# Patient Record
Sex: FEMALE | Race: WHITE | NOT HISPANIC OR LATINO | Employment: FULL TIME
[De-identification: names, ages, dates, MRNs, and addresses within clinical notes are randomized per-mention and may not be internally consistent; named-entity substitution may affect disease eponyms.]

---

## 2017-11-12 ENCOUNTER — HEALTH MAINTENANCE LETTER (OUTPATIENT)
Age: 57
End: 2017-11-12

## 2019-06-11 ENCOUNTER — RECORDS - HEALTHEAST (OUTPATIENT)
Dept: LAB | Facility: CLINIC | Age: 59
End: 2019-06-11

## 2019-06-11 LAB
CHOLEST SERPL-MCNC: 222 MG/DL
FASTING STATUS PATIENT QL REPORTED: YES
FASTING STATUS PATIENT QL REPORTED: YES
GLUCOSE BLD-MCNC: 94 MG/DL (ref 70–125)
HDLC SERPL-MCNC: 83 MG/DL
LDLC SERPL CALC-MCNC: 127 MG/DL
TRIGL SERPL-MCNC: 59 MG/DL
TSH SERPL DL<=0.005 MIU/L-ACNC: 0.96 UIU/ML (ref 0.3–5)

## 2020-03-02 ENCOUNTER — HEALTH MAINTENANCE LETTER (OUTPATIENT)
Age: 60
End: 2020-03-02

## 2020-12-14 ENCOUNTER — HEALTH MAINTENANCE LETTER (OUTPATIENT)
Age: 60
End: 2020-12-14

## 2020-12-17 ENCOUNTER — RECORDS - HEALTHEAST (OUTPATIENT)
Dept: LAB | Facility: CLINIC | Age: 60
End: 2020-12-17

## 2020-12-17 LAB
CHOLEST SERPL-MCNC: 183 MG/DL
FASTING STATUS PATIENT QL REPORTED: NORMAL
HDLC SERPL-MCNC: 66 MG/DL
LDLC SERPL CALC-MCNC: 102 MG/DL
TRIGL SERPL-MCNC: 77 MG/DL
TSH SERPL DL<=0.005 MIU/L-ACNC: 1.37 UIU/ML (ref 0.3–5)

## 2021-02-16 LAB — PAP SMEAR - HIM PATIENT REPORTED: NEGATIVE

## 2021-03-23 ENCOUNTER — IMMUNIZATION (OUTPATIENT)
Dept: NURSING | Facility: CLINIC | Age: 61
End: 2021-03-23
Payer: COMMERCIAL

## 2021-03-23 PROCEDURE — 91300 PR COVID VAC PFIZER DIL RECON 30 MCG/0.3 ML IM: CPT

## 2021-03-23 PROCEDURE — 0001A PR COVID VAC PFIZER DIL RECON 30 MCG/0.3 ML IM: CPT

## 2021-04-13 ENCOUNTER — IMMUNIZATION (OUTPATIENT)
Dept: NURSING | Facility: CLINIC | Age: 61
End: 2021-04-13
Attending: FAMILY MEDICINE
Payer: COMMERCIAL

## 2021-04-13 PROCEDURE — 91300 PR COVID VAC PFIZER DIL RECON 30 MCG/0.3 ML IM: CPT

## 2021-04-13 PROCEDURE — 0002A PR COVID VAC PFIZER DIL RECON 30 MCG/0.3 ML IM: CPT

## 2021-04-18 ENCOUNTER — HEALTH MAINTENANCE LETTER (OUTPATIENT)
Age: 61
End: 2021-04-18

## 2021-05-25 ENCOUNTER — RECORDS - HEALTHEAST (OUTPATIENT)
Dept: ADMINISTRATIVE | Facility: CLINIC | Age: 61
End: 2021-05-25

## 2021-05-26 ENCOUNTER — RECORDS - HEALTHEAST (OUTPATIENT)
Dept: ADMINISTRATIVE | Facility: CLINIC | Age: 61
End: 2021-05-26

## 2021-06-01 ENCOUNTER — RECORDS - HEALTHEAST (OUTPATIENT)
Dept: ADMINISTRATIVE | Facility: CLINIC | Age: 61
End: 2021-06-01

## 2021-10-02 ENCOUNTER — HEALTH MAINTENANCE LETTER (OUTPATIENT)
Age: 61
End: 2021-10-02

## 2021-12-02 ENCOUNTER — LAB REQUISITION (OUTPATIENT)
Dept: LAB | Facility: CLINIC | Age: 61
End: 2021-12-02

## 2021-12-02 DIAGNOSIS — E03.9 HYPOTHYROIDISM, UNSPECIFIED: ICD-10-CM

## 2021-12-02 PROCEDURE — 84443 ASSAY THYROID STIM HORMONE: CPT | Performed by: FAMILY MEDICINE

## 2021-12-03 LAB — TSH SERPL DL<=0.005 MIU/L-ACNC: 0.39 UIU/ML (ref 0.3–5)

## 2022-03-02 ENCOUNTER — MEDICAL CORRESPONDENCE (OUTPATIENT)
Dept: HEALTH INFORMATION MANAGEMENT | Facility: CLINIC | Age: 62
End: 2022-03-02
Payer: COMMERCIAL

## 2022-03-02 ENCOUNTER — TRANSFERRED RECORDS (OUTPATIENT)
Dept: HEALTH INFORMATION MANAGEMENT | Facility: CLINIC | Age: 62
End: 2022-03-02
Payer: COMMERCIAL

## 2022-03-16 ENCOUNTER — TRANSFERRED RECORDS (OUTPATIENT)
Dept: HEALTH INFORMATION MANAGEMENT | Facility: CLINIC | Age: 62
End: 2022-03-16

## 2022-03-19 ENCOUNTER — HEALTH MAINTENANCE LETTER (OUTPATIENT)
Age: 62
End: 2022-03-19

## 2022-05-09 ENCOUNTER — LAB REQUISITION (OUTPATIENT)
Dept: LAB | Facility: CLINIC | Age: 62
End: 2022-05-09

## 2022-05-09 DIAGNOSIS — M81.0 AGE-RELATED OSTEOPOROSIS WITHOUT CURRENT PATHOLOGICAL FRACTURE: ICD-10-CM

## 2022-05-09 LAB
ANION GAP SERPL CALCULATED.3IONS-SCNC: 13 MMOL/L (ref 5–18)
BUN SERPL-MCNC: 16 MG/DL (ref 8–22)
CALCIUM SERPL-MCNC: 9.4 MG/DL (ref 8.5–10.5)
CHLORIDE BLD-SCNC: 108 MMOL/L (ref 98–107)
CO2 SERPL-SCNC: 20 MMOL/L (ref 22–31)
CREAT SERPL-MCNC: 0.97 MG/DL (ref 0.6–1.1)
GFR SERPL CREATININE-BSD FRML MDRD: 65 ML/MIN/1.73M2
GLUCOSE BLD-MCNC: 98 MG/DL (ref 70–125)
POTASSIUM BLD-SCNC: 4 MMOL/L (ref 3.5–5)
PTH-INTACT SERPL-MCNC: 32 PG/ML (ref 10–86)
SODIUM SERPL-SCNC: 141 MMOL/L (ref 136–145)

## 2022-05-09 PROCEDURE — 82310 ASSAY OF CALCIUM: CPT | Performed by: FAMILY MEDICINE

## 2022-05-09 PROCEDURE — 83970 ASSAY OF PARATHORMONE: CPT | Performed by: FAMILY MEDICINE

## 2022-05-09 PROCEDURE — 82306 VITAMIN D 25 HYDROXY: CPT | Performed by: FAMILY MEDICINE

## 2022-05-10 LAB — DEPRECATED CALCIDIOL+CALCIFEROL SERPL-MC: 47 UG/L (ref 20–75)

## 2022-05-11 ENCOUNTER — TELEPHONE (OUTPATIENT)
Dept: ENDOCRINOLOGY | Facility: CLINIC | Age: 62
End: 2022-05-11
Payer: COMMERCIAL

## 2022-05-11 NOTE — TELEPHONE ENCOUNTER
----- Message from Abigail Young CMA sent at 5/11/2022  9:21 AM CDT -----  Regarding: Referral from March  Received fax from Lelo Maldonado on osteo referral that was sent to us March 2022. Do not see any encounters regarding scheduling, was not seen by Ellen Lindsay.

## 2022-05-13 ENCOUNTER — TRANSCRIBE ORDERS (OUTPATIENT)
Dept: ENDOCRINOLOGY | Facility: CLINIC | Age: 62
End: 2022-05-13

## 2022-05-13 DIAGNOSIS — M81.0 OSTEOPOROSIS WITHOUT CURRENT PATHOLOGICAL FRACTURE, UNSPECIFIED OSTEOPOROSIS TYPE: Primary | ICD-10-CM

## 2022-05-14 ENCOUNTER — HEALTH MAINTENANCE LETTER (OUTPATIENT)
Age: 62
End: 2022-05-14

## 2022-08-04 NOTE — TELEPHONE ENCOUNTER
RECORDS RECEIVED FROM: Osteoporosis without current pathological fracture; referred by Dr. Colon   DATE RECEIVED: 10/5/2022   NOTES (FOR ALL VISITS) STATUS DETAILS   OFFICE NOTES from referring provider Received [facility]:  3/2/22 - OBGYN OV with Dr. Colon   OFFICE NOTES from other specialist Care Everywhere [facility]:  9/20/21 - TMD OV with Dr. Mulet Pradera   ED NOTES N/A    OPERATIVE REPORT  (thyroid, pituitary, adrenal, parathyroid) N/A    MEDICATION LIST Received    IMAGING      XR (Chest) ce  [facility]:  3/18/20 - XR Esophagus  3/18/20 - XR Video Swallow   LABS     DIABETES: HBGA1C, CREATININE, FASTING LIPIDS, MICROALBUMIN URINE, POTASSIUM, TSH, T4    THYROID: TSH, T4, CBC, THYRODLONULIN, TOTAL T3, FREE T4, CALCITONIN, CEA Internal   [facility]:  5/9/22 - BMP  5/9/22 - Parathyroid Hormone  5/9/22 - Vitamin D  12/2/21 - TSH, T4  12/17/20 - Lipid

## 2022-09-03 ENCOUNTER — HEALTH MAINTENANCE LETTER (OUTPATIENT)
Age: 62
End: 2022-09-03

## 2022-10-04 NOTE — PROGRESS NOTES
Autumn Mason is a 61 year old female who is being evaluated via a billable video visit.        Endocrinology and Diabetes Clinic    Consulting provider: Ursula Colon MD  [address]  [place] [address]    Reason for consultation: Osteoporosis    HPI:   Autumn Mason is a 61 year old woman coming in regards to osteoporosis., FH of Cowden's disease, jaw pain, GERD, hepatitis, DONALD, hypothyrodisim, ulcerative colitis, fibromyalgia,   Patient was diagnosed via bone density report several years ago.  Patient had been on Boniva for about 5 years, which was stopped in fall 2021 about 1 year ago. The effect of Boniva treatment on bone density was felt to be insuffient.  Patient briefly was treated with calcitonin nasal spray.  Bone density has not improved on most recent DEXA scan.  Patient was switched to Prolia.  Received first injection this May or June 2022.  This was given in her primary care's office..  Patient tolerated the injection well.  Patient is concerned about high copayment for the Prolia injection.  Patient does not think she paid full sprague. Se notes that she had met all her deductibles otherwise when receiving the Prolia.    Today there is no bone density report available to us for review.Most recent DXA scan 1/1/2019    Had imaging thyroid, transvaginal pelvic ultrasound on thyroid our system in 3/2022      Prior fractures in 1/2019 fractures collar bone fall on ice  Height loss about 1 inch    Prior osteoporosis medications Calcitonin injections, Denosumab, Ibandronate    Estrogen treatment no    Falls in the last year no    Dietary Calcium: avoids dairy, consumes almond milk  Dietary Protein intake sufficient  Calcium supplements twice day  Vitamin D supplements 1000 units daily  Alcohol consumption no  Smoking no  Caffein consumption 2-4 cups a day    Exercise less since  is sick, 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  Will review DEXA report once available.  After 5 years of Boniva treatment, treatment was intensified to Denosumab. She received Prolia earlier this spring which she tolerated well.  However concern of high copay at the time., unclear what that payment actually reflected.  Risk factor for osteoporosis include postmenopausal state, otherwise family history of osteoporosis in her maternal grandmother.  Lab evaluation has been unrevealing, calcium intake appears sufficient, vitamin D levels have been in excellent range.    Plan:   Obtain DXA report  If diagnosis is confirmed, will order Prolia treatment to be done in our infusion clinic if possible.  Continue calcium and vitamin D  Follow-up with me in 6 months      Kymberly Francisco MD  Endocrinology and Diabetes  Telephone contact:  [facility] [place] [phone]  [facility] [phone]        Past Medical and Past Surgical History:  Past Medical History:   Diagnosis Date     GERD (gastroesophageal reflux disease)      Hepatitis C, chronic (H)      Nosebleeds      Obstructive sleep apnea      Thyroid disease     hypothyroidism     Ulcerative colitis        Past Surgical History:   Procedure Laterality Date     APPENDECTOMY       ENT SURGERY      sinus surgery     GYN SURGERY      Bilateral Salpingectomy and Oopherectomy       Medications:   Current Outpatient Medications   Medication Sig Dispense Refill     Ascorbic Acid (VITAMIN C CR) 1000 MG TBCR Take  by mouth.       Calcium Carbonate-Vitamin D (CALCIUM 600  + D OR) Take 2 tablets by mouth daily.       celecoxib (CELEBREX) 200 MG capsule Take 200 mg by mouth daily.       cetirizine (ZYRTEC ALLERGY) 10 MG tablet Take 10 mg by mouth daily       Cholecalciferol (VITAMIN D) 1000 UNITS capsule Take 1 capsule by mouth daily.       Ferrous Sulfate (IRON) 325 (65 FE) MG tablet Take 1 tablet by mouth daily.       levothyroxine (SYNTHROID, LEVOTHROID) 137 MCG tablet Take 125 mcg by mouth daily        mesalamine (DELZICOL) 400 MG delayed release capsule Take 400 mg by mouth 3 times daily       milnacipran (SAVELLA) 100 MG TABS Take 100 mg by mouth. 1-2 times per day       NORETHINDRONE PO Take 7.5 mg by mouth. daily       omeprazole (PRILOSEC) 40 MG capsule Take 40 mg by mouth. 1-2 times per day       Probiotic Product (ALIGN) 4 MG CAPS Take 1 tablet by mouth daily.       RISEdronate (ACTONEL) 35 MG tablet Take 35 mg by mouth every 7 days.       sucralfate (CARAFATE) 1 GM tablet Take 1 g by mouth. 1-4 times per day       topiramate (TOPAMAX) 200 MG tablet Take 200 mg by mouth. 1 x daily       venlafaxine (EFFEXOR) 75 MG tablet Take  by mouth. 1 x a day         Allergies:   Allergies   Allergen Reactions     Fosamax Other (See Comments)     Body aches       Zonisamide Other (See Comments)     depression     Bacitracin Rash     Erythromycin Rash     Neosporin Rash       Social History     Tobacco Use     Smoking status: Never Smoker     Smokeless tobacco: Not on file   Substance Use Topics     Alcohol use: No       Physical Examination:  BP (!) 149/84 (BP Location: Left arm, Patient Position: Sitting, Cuff Size: Adult Regular)   Pulse 88   Wt 75.3 kg (166 lb 1.6 oz)   BMI 27.64 kg/m      There were no vitals taken for this visit.  There is no height or weight on file to calculate BMI.    Wt Readings from Last 4 Encounters:  09/05/13 : 79.8 kg (176 lb)  08/22/13 : 72.6 kg (160 lb)  03/14/12 : 62.1 kg (137 lb)    General: Well appearing female in no distress, up and go quick  Eyes:  EOMI. Sclerae and conjunctivae are clear.   HENT: No thyromegaly or mass.    Lymphatic: No cervical or supraclavicular lymphadenopathy.  Cardiovascular: RRR, with normal S1+S2 and no murmurs.   Skin: No rash or lesions. No abdominal striae.    Extremities: No peripheral edema.   Neurologic: No tremor with hands outstretched. 2+ patellar reflexes.  Muskuloskeletal: rib-pelvis distance 2 finger breath,     Labs and Studies:   Lab Results   Component Value Date    DAYNE 9.4 05/09/2022    DAYNE 8.7 03/14/2012    PTHI 32 05/09/2022    CR 0.97 05/09/2022    CR 0.77 03/14/2012     05/09/2022    TSH 0.39 12/02/2021    HGB 10.1 (L) 03/14/2012 5/9/22 25OHVitD 47    Answers for HPI/ROS submitted by the patient on 9/29/2022  General Symptoms: No  Skin Symptoms: No  HENT Symptoms: No  EYE SYMPTOMS: No  HEART SYMPTOMS: No  LUNG SYMPTOMS: No  INTESTINAL SYMPTOMS: No  URINARY SYMPTOMS: No  GYNECOLOGIC SYMPTOMS: No  BREAST SYMPTOMS: No  SKELETAL SYMPTOMS: No  BLOOD SYMPTOMS: No  NERVOUS SYSTEM SYMPTOMS: No  MENTAL HEALTH SYMPTOMS: No

## 2022-10-05 ENCOUNTER — PRE VISIT (OUTPATIENT)
Dept: ENDOCRINOLOGY | Facility: CLINIC | Age: 62
End: 2022-10-05

## 2022-10-05 ENCOUNTER — OFFICE VISIT (OUTPATIENT)
Dept: ENDOCRINOLOGY | Facility: CLINIC | Age: 62
End: 2022-10-05
Attending: OBSTETRICS & GYNECOLOGY
Payer: COMMERCIAL

## 2022-10-05 VITALS
HEART RATE: 88 BPM | SYSTOLIC BLOOD PRESSURE: 149 MMHG | BODY MASS INDEX: 27.64 KG/M2 | DIASTOLIC BLOOD PRESSURE: 84 MMHG | WEIGHT: 166.1 LBS

## 2022-10-05 DIAGNOSIS — M81.0 OSTEOPOROSIS WITHOUT CURRENT PATHOLOGICAL FRACTURE, UNSPECIFIED OSTEOPOROSIS TYPE: ICD-10-CM

## 2022-10-05 PROCEDURE — 99204 OFFICE O/P NEW MOD 45 MIN: CPT | Performed by: INTERNAL MEDICINE

## 2022-10-05 ASSESSMENT — PAIN SCALES - GENERAL: PAINLEVEL: NO PAIN (0)

## 2022-10-05 NOTE — LETTER
10/5/2022       RE: Autumn Mason  [address] [address]     Dear Colleague,    Thank you for referring your patient, Autumn Mason, to the [facility] [place] at [facility]. Please see a copy of my visit note below.      Autumn Mason is a 61 year old female who is being evaluated via a billable video visit.        Endocrinology and Diabetes Clinic    Consulting provider: Ursula Colon MD  [address]  [place] [address]    Reason for consultation: Osteoporosis    HPI:   Autumn Mason is a 61 year old woman coming in regards to osteoporosis., FH of Cowden's disease, jaw pain, GERD, hepatitis, DONALD, hypothyrodisim, ulcerative colitis, fibromyalgia,   Patient was diagnosed via bone density report several years ago.  Patient had been on Boniva for about 5 years, which was stopped in fall 2021 about 1 year ago. The effect of Boniva treatment on bone density was felt to be insuffient.  Patient briefly was treated with calcitonin nasal spray.  Bone density has not improved on most recent DEXA scan.  Patient was switched to Prolia.  Received first injection this May or June 2022.  This was given in her primary care's office..  Patient tolerated the injection well.  Patient is concerned about high copayment for the Prolia injection.  Patient does not think she paid full sprague. Se notes that she had met all her deductibles otherwise when receiving the Prolia.    Today there is no bone density report available to us for review.Most recent DXA scan 1/1/2019    Had imaging thyroid, transvaginal pelvic ultrasound on thyroid our system in 3/2022      Prior fractures in 1/2019 fractures collar bone fall on ice  Height loss about 1 inch    Prior osteoporosis medications Calcitonin injections, Denosumab, Ibandronate    Estrogen treatment no    Falls in the last year no    Dietary Calcium: avoids dairy, consumes  almond milk  Dietary Protein intake sufficient  Calcium supplements twice day  Vitamin D supplements 1000 units daily  Alcohol consumption no  Smoking no  Caffein consumption 2-4 cups a day    Exercise less since  is sick, 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  Will review DEXA report once available.  After 5 years of Boniva treatment, treatment was intensified to Denosumab. She received Prolia earlier this spring which she tolerated well.  However concern of high copay at the time., unclear what that payment actually reflected.  Risk factor for osteoporosis include postmenopausal state, otherwise family history of osteoporosis in her maternal grandmother.  Lab evaluation has been unrevealing, calcium intake appears sufficient, vitamin D levels have been in excellent range.    Plan:   Obtain DXA report  If diagnosis is confirmed, will order Prolia treatment to be done in our infusion clinic if possible.  Continue calcium and vitamin D  Follow-up with me in 6 months      Kymberly Francisco MD  Endocrinology and Diabetes  Telephone contact:  [facility] [place] [phone]  [facility] [phone]        Past Medical and Past Surgical History:  Past Medical History:   Diagnosis Date     GERD (gastroesophageal reflux disease)      Hepatitis C, chronic (H)      Nosebleeds      Obstructive sleep apnea      Thyroid disease     hypothyroidism     Ulcerative colitis        Past Surgical History:   Procedure  Laterality Date     APPENDECTOMY       ENT SURGERY      sinus surgery     GYN SURGERY      Bilateral Salpingectomy and Oopherectomy       Medications:   Current Outpatient Medications   Medication Sig Dispense Refill     Ascorbic Acid (VITAMIN C CR) 1000 MG TBCR Take  by mouth.       Calcium Carbonate-Vitamin D (CALCIUM 600 + D OR) Take 2 tablets by mouth daily.       celecoxib (CELEBREX) 200 MG capsule Take 200 mg by mouth daily.       cetirizine (ZYRTEC ALLERGY) 10 MG tablet Take 10 mg by mouth daily       Cholecalciferol (VITAMIN D) 1000 UNITS capsule Take 1 capsule by mouth daily.       Ferrous Sulfate (IRON) 325 (65 FE) MG tablet Take 1 tablet by mouth daily.       levothyroxine (SYNTHROID, LEVOTHROID) 137 MCG tablet Take 125 mcg by mouth daily        mesalamine (DELZICOL) 400 MG delayed release capsule Take 400 mg by mouth 3 times daily       milnacipran (SAVELLA) 100 MG TABS Take 100 mg by mouth. 1-2 times per day       NORETHINDRONE PO Take 7.5 mg by mouth. daily       omeprazole (PRILOSEC) 40 MG capsule Take 40 mg by mouth. 1-2 times per day       Probiotic Product (ALIGN) 4 MG CAPS Take 1 tablet by mouth daily.       RISEdronate (ACTONEL) 35 MG tablet Take 35 mg by mouth every 7 days.       sucralfate (CARAFATE) 1 GM tablet Take 1 g by mouth. 1-4 times per day       topiramate (TOPAMAX) 200 MG tablet Take 200 mg by mouth. 1 x daily       venlafaxine (EFFEXOR) 75 MG tablet Take  by mouth. 1 x a day         Allergies:   Allergies   Allergen Reactions     Fosamax Other (See Comments)     Body aches       Zonisamide Other (See Comments)     depression     Bacitracin Rash     Erythromycin Rash     Neosporin Rash       Social History     Tobacco Use     Smoking status: Never Smoker     Smokeless tobacco: Not on file   Substance Use Topics     Alcohol use: No       Physical Examination:  BP (!) 149/84 (BP Location: Left arm, Patient Position: Sitting, Cuff Size: Adult Regular)   Pulse 88   Wt 75.3 kg (166 lb  1.6 oz)   BMI 27.64 kg/m      There were no vitals taken for this visit.  There is no height or weight on file to calculate BMI.    Wt Readings from Last 4 Encounters:  09/05/13 : 79.8 kg (176 lb)  08/22/13 : 72.6 kg (160 lb)  03/14/12 : 62.1 kg (137 lb)    General: Well appearing female in no distress, up and go quick  Eyes: EOMI. Sclerae and conjunctivae are clear.   HENT: No thyromegaly or mass.    Lymphatic: No cervical or supraclavicular lymphadenopathy.  Cardiovascular: RRR, with normal S1+S2 and no murmurs.   Skin: No rash or lesions. No abdominal striae.    Extremities: No peripheral edema.   Neurologic: No tremor with hands outstretched. 2+ patellar reflexes.  Muskuloskeletal: rib-pelvis distance 2 finger breath,     Labs and Studies:   Lab Results   Component Value Date    DAYNE 9.4 05/09/2022    DAYNE 8.7 03/14/2012    PTHI 32 05/09/2022    CR 0.97 05/09/2022    CR 0.77 03/14/2012     05/09/2022    TSH 0.39 12/02/2021    HGB 10.1 (L) 03/14/2012 5/9/22 25OHVitD 47    Answers for HPI/ROS submitted by the patient on 9/29/2022  General Symptoms: No  Skin Symptoms: No  HENT Symptoms: No  EYE SYMPTOMS: No  HEART SYMPTOMS: No  LUNG SYMPTOMS: No  INTESTINAL SYMPTOMS: No  URINARY SYMPTOMS: No  GYNECOLOGIC SYMPTOMS: No  BREAST SYMPTOMS: No  SKELETAL SYMPTOMS: No  BLOOD SYMPTOMS: No  NERVOUS SYSTEM SYMPTOMS: No  MENTAL HEALTH SYMPTOMS: No

## 2022-10-05 NOTE — PATIENT INSTRUCTIONS
We are gong to try to order the Prolia injections    Please find out injkection date of prior [id number] injection    Please find prior DXA and have facility send the report, and image

## 2022-10-06 RX ORDER — CHOLECALCIFEROL (VITAMIN D3) 10(400)/ML
DROPS ORAL
COMMUNITY
End: 2023-04-05

## 2022-11-07 ENCOUNTER — HOSPITAL ENCOUNTER (OUTPATIENT)
Dept: MRI IMAGING | Facility: HOSPITAL | Age: 62
Discharge: HOME | End: 2022-11-07
Attending: OBSTETRICS & GYNECOLOGY | Admitting: OBSTETRICS & GYNECOLOGY
Payer: COMMERCIAL

## 2022-11-07 DIAGNOSIS — Z82.79 FAMILY HISTORY OF OTHER CONGENITAL MALFORMATIONS, DEFORMATIONS AND CHROMOSOMAL ABNORMALITIES: ICD-10-CM

## 2022-11-07 PROCEDURE — 255N000002 HC RX 255 OP 636: Performed by: OBSTETRICS & GYNECOLOGY

## 2022-11-07 PROCEDURE — A9585 GADOBUTROL INJECTION: HCPCS | Performed by: OBSTETRICS & GYNECOLOGY

## 2022-11-07 PROCEDURE — 77049 MRI BREAST C-+ W/CAD BI: CPT

## 2022-11-07 RX ORDER — GADOBUTROL 604.72 MG/ML
0.1 INJECTION INTRAVENOUS ONCE
Status: COMPLETED | OUTPATIENT
Start: 2022-11-07 | End: 2022-11-07

## 2022-11-07 RX ADMIN — GADOBUTROL 7.5 ML: 604.72 INJECTION INTRAVENOUS at 08:30

## 2023-02-09 ENCOUNTER — ANCILLARY PROCEDURE (OUTPATIENT)
Dept: MAMMOGRAPHY | Facility: CLINIC | Age: 63
End: 2023-02-09
Payer: COMMERCIAL

## 2023-02-09 DIAGNOSIS — Z12.31 VISIT FOR SCREENING MAMMOGRAM: ICD-10-CM

## 2023-02-09 PROCEDURE — 77067 SCR MAMMO BI INCL CAD: CPT | Mod: TC | Performed by: RADIOLOGY

## 2023-03-08 ENCOUNTER — TELEPHONE (OUTPATIENT)
Dept: ENDOCRINOLOGY | Facility: CLINIC | Age: 63
End: 2023-03-08
Payer: COMMERCIAL

## 2023-03-08 DIAGNOSIS — M81.0 OSTEOPOROSIS WITHOUT CURRENT PATHOLOGICAL FRACTURE, UNSPECIFIED OSTEOPOROSIS TYPE: Primary | ICD-10-CM

## 2023-03-08 NOTE — TELEPHONE ENCOUNTER
Pt for osteoporosis follow up, overdue for DXA    Kymberly Francisco MD  Staff Physician  Division of Endocrinology  [facility]  Pager #[phone]

## 2023-03-29 ENCOUNTER — HOSPITAL ENCOUNTER (OUTPATIENT)
Dept: BONE DENSITY | Facility: HOSPITAL | Age: 63
Discharge: HOME | End: 2023-03-29
Admitting: INTERNAL MEDICINE
Payer: COMMERCIAL

## 2023-03-29 DIAGNOSIS — M81.0 OSTEOPOROSIS WITHOUT CURRENT PATHOLOGICAL FRACTURE, UNSPECIFIED OSTEOPOROSIS TYPE: ICD-10-CM

## 2023-03-29 PROCEDURE — 77080 DXA BONE DENSITY AXIAL: CPT

## 2023-03-29 ASSESSMENT — ENCOUNTER SYMPTOMS
NECK MASS: 0
SMELL DISTURBANCE: 0
SINUS CONGESTION: 0
SINUS PAIN: 1
HOARSE VOICE: 0
SORE THROAT: 1
TROUBLE SWALLOWING: 0
TASTE DISTURBANCE: 0

## 2023-04-04 ENCOUNTER — TELEPHONE (OUTPATIENT)
Dept: ENDOCRINOLOGY | Facility: CLINIC | Age: 63
End: 2023-04-04
Payer: COMMERCIAL

## 2023-04-05 ENCOUNTER — OFFICE VISIT (OUTPATIENT)
Dept: ENDOCRINOLOGY | Facility: CLINIC | Age: 63
End: 2023-04-05
Payer: COMMERCIAL

## 2023-04-05 ENCOUNTER — LAB (OUTPATIENT)
Dept: LAB | Facility: CLINIC | Age: 63
End: 2023-04-05
Payer: COMMERCIAL

## 2023-04-05 VITALS
DIASTOLIC BLOOD PRESSURE: 82 MMHG | SYSTOLIC BLOOD PRESSURE: 137 MMHG | HEART RATE: 86 BPM | WEIGHT: 167.3 LBS | BODY MASS INDEX: 27.84 KG/M2 | OXYGEN SATURATION: 100 %

## 2023-04-05 DIAGNOSIS — M81.0 OSTEOPOROSIS WITHOUT CURRENT PATHOLOGICAL FRACTURE, UNSPECIFIED OSTEOPOROSIS TYPE: Primary | ICD-10-CM

## 2023-04-05 DIAGNOSIS — E03.9 ACQUIRED HYPOTHYROIDISM: ICD-10-CM

## 2023-04-05 LAB — TSH SERPL DL<=0.005 MIU/L-ACNC: 0.53 UIU/ML (ref 0.3–4.2)

## 2023-04-05 PROCEDURE — 99214 OFFICE O/P EST MOD 30 MIN: CPT | Performed by: INTERNAL MEDICINE

## 2023-04-05 PROCEDURE — 36415 COLL VENOUS BLD VENIPUNCTURE: CPT | Performed by: PATHOLOGY

## 2023-04-05 PROCEDURE — 84443 ASSAY THYROID STIM HORMONE: CPT | Performed by: PATHOLOGY

## 2023-04-05 RX ORDER — EPINEPHRINE 1 MG/ML
0.3 INJECTION, SOLUTION, CONCENTRATE INTRAVENOUS EVERY 5 MIN PRN
Status: CANCELLED | OUTPATIENT
Start: 2023-04-05

## 2023-04-05 RX ORDER — METHYLPREDNISOLONE SODIUM SUCCINATE 125 MG/2ML
125 INJECTION, POWDER, LYOPHILIZED, FOR SOLUTION INTRAMUSCULAR; INTRAVENOUS
Status: CANCELLED
Start: 2023-04-05

## 2023-04-05 RX ORDER — ALBUTEROL SULFATE 0.83 MG/ML
2.5 SOLUTION RESPIRATORY (INHALATION)
Status: CANCELLED | OUTPATIENT
Start: 2023-04-05

## 2023-04-05 RX ORDER — LEVOTHYROXINE SODIUM 112 UG/1
1 TABLET ORAL DAILY
COMMUNITY
Start: 2010-08-01

## 2023-04-05 RX ORDER — MEPERIDINE HYDROCHLORIDE 25 MG/ML
25 INJECTION INTRAMUSCULAR; INTRAVENOUS; SUBCUTANEOUS EVERY 30 MIN PRN
Status: CANCELLED | OUTPATIENT
Start: 2023-04-05

## 2023-04-05 RX ORDER — DIPHENHYDRAMINE HYDROCHLORIDE 50 MG/ML
50 INJECTION INTRAMUSCULAR; INTRAVENOUS
Status: CANCELLED
Start: 2023-04-05

## 2023-04-05 RX ORDER — ALBUTEROL SULFATE 90 UG/1
1-2 AEROSOL, METERED RESPIRATORY (INHALATION)
Status: CANCELLED
Start: 2023-04-05

## 2023-04-05 ASSESSMENT — PAIN SCALES - GENERAL: PAINLEVEL: MODERATE PAIN (4)

## 2023-04-05 NOTE — PROGRESS NOTES
Endocrinology and Diabetes Clinic      Follow up for Osteoporosis      Assessment:  Middle-aged postmenopausal woman with diagnosis of osteoporosis and hypothyroidism    Osteoporosis  Middle-aged postmenopausal woman with diagnosis of osteoporosis now on Prolia treatment.  Tolerates these well.  Most recent bone density report shows improvement very significantly at the spine, and to a small degree at the bilateral femur.  Please note that the report actually is wrong as there has been a 1.4% increase in the bilateral femur density, not a decrease.  Clinically patient is stable.     Hypothyroidism  Longstanding hypothyroidism on levothyroxine treatment.  Overall patient is euthyroid.  Recheck labs.  Takes levothyroxine as directed    Plan:   Continue Prolia injections, patient would like to switch to the [facility] infusion center  Continue calcium supplements twice daily recommended to take them not in the morning  Continue levothyroxine 112 mcg  Recheck TSH today and adjust levothyroxine if indicated    Follow-up with me in 1 year    Kymberly Francisco MD  Endocrinology and Diabetes  Telephone contact:  [facility] [place] [phone]  [facility] [phone]        Interval history  - teeth doing well  - weight stable, abdominal dyscomfort no  - since last visit falls no, fractures no   - Patient notes her mother was diagnosed with osteoporosis per bone density, and her maternal grandmother had osteoporosis with large curvature    Most recen DXA scan 3/29/23 T-score -2.3 lumbar spine change +13% improvement    Osteoporosis medication since last visit: Prolia injection around 7/22 1/23; gets these not in the infusion center, however would like to switch to the infusion center at the [facility]    Calcium supplement twice daily  Vitamin D: With calcium  Physical activity: Somewhat limited, patient is quite busy taking care of her  who is struggling with Alzheimer's  disease      Past Medical and Past Surgical History:  Past Medical History:   Diagnosis Date     GERD (gastroesophageal reflux disease)      Hepatitis C, chronic (H)      Nosebleeds      Obstructive sleep apnea      Thyroid disease     hypothyroidism     Ulcerative colitis        Past Surgical History:   Procedure Laterality Date     APPENDECTOMY       ENT SURGERY      sinus surgery     GYN SURGERY      Bilateral Salpingectomy and Oopherectomy       Medications:   Current Outpatient Medications   Medication Sig Dispense Refill     Ascorbic Acid 1000 MG TBCR Take  by mouth.       Calcium Carb-Cholecalciferol (CALCIUM 1000 + D) 1000-800 MG-UNIT TABS calcium   600 mg bid       celecoxib (CELEBREX) 200 MG capsule Take 200 mg by mouth daily.       cetirizine (ZYRTEC) 10 MG tablet Take 10 mg by mouth daily       Cholecalciferol (VITAMIN D) 1000 UNITS capsule Take 1 capsule by mouth daily.       levothyroxine (SYNTHROID/LEVOTHROID) 112 MCG tablet Take 1 tablet by mouth daily       milnacipran (SAVELLA) 100 MG TABS tablet Take 100 mg by mouth. 1-2 times per day       omeprazole (PRILOSEC) 40 MG capsule Take 40 mg by mouth. 1-2 times per day       sucralfate (CARAFATE) 1 GM tablet Take 1 g by mouth. 1-4 times per day       topiramate (TOPAMAX) 200 MG tablet Take 200 mg by mouth. 1 x daily         Allergies:   Allergies   Allergen Reactions     Fosamax Other (See Comments)     Body aches       Zonisamide Other (See Comments)     depression     Bacitracin Rash     Erythromycin Rash     Neosporin Rash       Social History     Tobacco Use     Smoking status: Never     Smokeless tobacco: Never   Vaping Use     Vaping status: Not on file   Substance Use Topics     Alcohol use: No       Physical Examination:  /82   Pulse 86   Wt 75.9 kg (167 lb 4.8 oz)   SpO2 100%   BMI 27.84 kg/m      There were no vitals taken for this visit.  There is no height or weight on file to calculate BMI.    Wt Readings from Last 4  Encounters:  09/05/13 : 79.8 kg (176 lb)  08/22/13 : 72.6 kg (160 lb)  03/14/12 : 62.1 kg (137 lb)    General: Well appearing female in no distress, up and go quick  Eyes: EOMI. Sclerae and conjunctivae are clear.   HENT: No thyromegaly or mass.    Lymphatic: No cervical or supraclavicular lymphadenopathy.  Cardiovascular: RRR, with normal S1+S2 and no murmurs.   Skin: No rash or lesions. No abdominal striae.    Extremities: No peripheral edema.   Neurologic: No tremor with hands outstretched. 2+ patellar reflexes.  Muskuloskeletal: rib-pelvis distance 2 finger breath,     Labs and Studies:   Lab Results   Component Value Date    DAYNE 9.4 05/09/2022    DAYNE 8.7 03/14/2012    PTHI 32 05/09/2022    CR 0.97 05/09/2022    CR 0.77 03/14/2012     05/09/2022    TSH 0.39 12/02/2021    HGB 10.1 (L) 03/14/2012 5/9/22 25OHVitD 47    Results for orders placed during the hospital encounter of 03/29/23    Dexa hip/pelvis/spine    Narrative  EXAM: DX HIP/PELVIS/SPINE  LOCATION: [facility]  DATE/TIME: 3/29/2023 1:41 PM    INDICATION:  Osteoporosis without current pathological fracture, unspecified osteoporosis type. Postmenopausal.  DEMOGRAPHICS: Age- 62 years. Gender- Female. Menopausal status- Postmenopausal.  COMPARISON: 10/14/2021  TECHNIQUE: Dual-energy x-ray absorptiometry (DXA) performed with routine technique. Trabecular bone score (TBS) analysis performed.    FINDINGS:    DXA RESULTS  -Lumbar Spine: L1-L4: BMD: 0.901 g/cm2. T-score: -2.3. Z-score: -0.8. Degenerative change may artifactually increase BMD.  -RIGHT Hip Total: BMD: 0.765 g/cm2. T-score: -1.9. Z-score: -0.8.  -RIGHT Hip Femoral neck: BMD: 0.753 g/cm2. T-score: -2.0. Z-score: -0.6.  -LEFT Hip Total: BMD: 0.778 g/cm2. T-score: -1.8. Z-score: -0.7.  -LEFT Hip Femoral neck: BMD: 0.802 g/cm2. T-score: -1.7. Z-score: -0.3.      WHO T-SCORE CRITERIA  -Normal: T score at or above -1 SD  -Osteopenia: T score between -1 and -2.5  SD  -Osteoporosis: T score at or below -2.5 SD    The World Health Organization (WHO) criteria is applicable to perimenopausal females, postmenopausal females, and men aged 50 years or older.    TBS RESULTS  -Lumbar Spine TBS: L1-L4: TBS T-score: -2.7.TBS Z-score: -0.8.    The TBS is a DXA derived measurement for fracture risk assessment, and reflects the structural condition of the bone microarchitecture. It can be used to adjust WHO Fracture Risk Assessment Tool (FRAX) probability of fracture in postmenopausal women and  older men. The calculated probabilities of fracture have been shown to be more accurate when computed with the TBS.    INTERVAL CHANGE  -There has been a 13.2% increase in lumbar spine BMD. This may be partially due to increased degenerative changes.  -There has been a 1.4% decrease in bilateral hip BMD.      FRACTURE RISK  -FRAX Results: The 10 year probability of major osteoporotic fracture is 31.9%, and of hip fracture is 2.9%, based on right femoral neck BMD.  -TBS-adjusted FRAX Results: The 10 year probability of major osteoporotic fracture is 32.2%, and of hip fracture is 3.3%.    RECOMMENDATIONS  Consider treatment if major osteoporotic fracture score is greater than or equal to 20%, and if the hip fracture score is greater than or equal to 3%.    Impression  IMPRESSION: Low bone density (OSTEOPENIA). T score meets the WHO criteria for low bone density (osteopenia) at one or more measured sites. The risk of osteoporotic fracture increases approximately two-fold for each standard deviation decrease in T-score.        HPI  Patient was diagnosed via bone density report with osteoporosis several years ago.  Patient had been on Boniva for about 5 years, which was stopped in fall 2021 about 1 year ago. The effect of Boniva treatment on bone density was felt to be insuffient.  Patient briefly was treated with calcitonin nasal spray.  Patient was switched to Prolia.  Received first injection this  May or June 2022.  This was given in her primary ca

## 2023-04-05 NOTE — LETTER
4/5/2023       RE: Autumn Mason  [address] [facility] [address]     Dear Colleague,    Thank you for referring your patient, Autumn Mason, to the [facility] [place] at [facility]. Please see a copy of my visit note below.      Endocrinology and Diabetes Clinic      Follow up for Osteoporosis      Assessment:  Middle-aged postmenopausal woman with diagnosis of osteoporosis and hypothyroidism    Osteoporosis  Middle-aged postmenopausal woman with diagnosis of osteoporosis now on Prolia treatment.  Tolerates these well.  Most recent bone density report shows improvement very significantly at the spine, and to a small degree at the bilateral femur.  Please note that the report actually is wrong as there has been a 1.4% increase in the bilateral femur density, not a decrease.  Clinically patient is stable.     Hypothyroidism  Longstanding hypothyroidism on levothyroxine treatment.  Overall patient is euthyroid.  Recheck labs.  Takes levothyroxine as directed    Plan:   Continue Prolia injections, patient would like to switch to the [facility] infusion center  Continue calcium supplements twice daily recommended to take them not in the morning  Continue levothyroxine 112 mcg  Recheck TSH today and adjust levothyroxine if indicated    Follow-up with me in 1 year    Kymberly Francisco MD  Endocrinology and Diabetes  Telephone contact:  [facility] [place] [phone]  [facility] [phone]        Interval history  - teeth doing well  - weight stable, abdominal dyscomfort no  - since last visit falls no, fractures no   - Patient notes her mother was diagnosed with osteoporosis per bone density, and her maternal grandmother had osteoporosis with large curvature    Most recen DXA scan 3/29/23 T-score -2.3 lumbar spine change +13% improvement    Osteoporosis medication since last visit: Prolia  injection around 7/22 1/23; gets these not in the infusion center, however would like to switch to the infusion center at the [facility]    Calcium supplement twice daily  Vitamin D: With calcium  Physical activity: Somewhat limited, patient is quite busy taking care of her  who is struggling with Alzheimer's disease      Past Medical and Past Surgical History:  Past Medical History:   Diagnosis Date    GERD (gastroesophageal reflux disease)     Hepatitis C, chronic (H)     Nosebleeds     Obstructive sleep apnea     Thyroid disease     hypothyroidism    Ulcerative colitis        Past Surgical History:   Procedure Laterality Date    APPENDECTOMY      ENT SURGERY      sinus surgery    GYN SURGERY      Bilateral Salpingectomy and Oopherectomy       Medications:   Current Outpatient Medications   Medication Sig Dispense Refill    Ascorbic Acid 1000 MG TBCR Take  by mouth.      Calcium Carb-Cholecalciferol (CALCIUM 1000 + D) 1000-800 MG-UNIT TABS calcium   600 mg bid      celecoxib (CELEBREX) 200 MG capsule Take 200 mg by mouth daily.      cetirizine (ZYRTEC) 10 MG tablet Take 10 mg by mouth daily      Cholecalciferol (VITAMIN D) 1000 UNITS capsule Take 1 capsule by mouth daily.      levothyroxine (SYNTHROID/LEVOTHROID) 112 MCG tablet Take 1 tablet by mouth daily      milnacipran (SAVELLA) 100 MG TABS tablet Take 100 mg by mouth. 1-2 times per day      omeprazole (PRILOSEC) 40 MG capsule Take 40 mg by mouth. 1-2 times per day      sucralfate (CARAFATE) 1 GM tablet Take 1 g by mouth. 1-4 times per day      topiramate (TOPAMAX) 200 MG tablet Take 200 mg by mouth. 1 x daily         Allergies:   Allergies   Allergen Reactions    Fosamax Other (See Comments)     Body aches      Zonisamide Other (See Comments)     depression    Bacitracin Rash    Erythromycin Rash    Neosporin Rash       Social History     Tobacco Use    Smoking status: Never    Smokeless tobacco: Never   Vaping Use    Vaping status: Not on file   Substance  Use Topics    Alcohol use: No       Physical Examination:  /82   Pulse 86   Wt 75.9 kg (167 lb 4.8 oz)   SpO2 100%   BMI 27.84 kg/m      There were no vitals taken for this visit.  There is no height or weight on file to calculate BMI.    Wt Readings from Last 4 Encounters:  09/05/13 : 79.8 kg (176 lb)  08/22/13 : 72.6 kg (160 lb)  03/14/12 : 62.1 kg (137 lb)    General: Well appearing female in no distress, up and go quick  Eyes: EOMI. Sclerae and conjunctivae are clear.   HENT: No thyromegaly or mass.    Lymphatic: No cervical or supraclavicular lymphadenopathy.  Cardiovascular: RRR, with normal S1+S2 and no murmurs.   Skin: No rash or lesions. No abdominal striae.    Extremities: No peripheral edema.   Neurologic: No tremor with hands outstretched. 2+ patellar reflexes.  Muskuloskeletal: rib-pelvis distance 2 finger breath,     Labs and Studies:   Lab Results   Component Value Date    DAYNE 9.4 05/09/2022    DAYNE 8.7 03/14/2012    PTHI 32 05/09/2022    CR 0.97 05/09/2022    CR 0.77 03/14/2012     05/09/2022    TSH 0.39 12/02/2021    HGB 10.1 (L) 03/14/2012 5/9/22 25OHVitD 47    Results for orders placed during the hospital encounter of 03/29/23    Dexa hip/pelvis/spine    Narrative  EXAM: DX HIP/PELVIS/SPINE  LOCATION: [facility]  DATE/TIME: 3/29/2023 1:41 PM    INDICATION:  Osteoporosis without current pathological fracture, unspecified osteoporosis type. Postmenopausal.  DEMOGRAPHICS: Age- 62 years. Gender- Female. Menopausal status- Postmenopausal.  COMPARISON: 10/14/2021  TECHNIQUE: Dual-energy x-ray absorptiometry (DXA) performed with routine technique. Trabecular bone score (TBS) analysis performed.    FINDINGS:    DXA RESULTS  -Lumbar Spine: L1-L4: BMD: 0.901 g/cm2. T-score: -2.3. Z-score: -0.8. Degenerative change may artifactually increase BMD.  -RIGHT Hip Total: BMD: 0.765 g/cm2. T-score: -1.9. Z-score: -0.8.  -RIGHT Hip Femoral neck: BMD: 0.753 g/cm2. T-score:  -2.0. Z-score: -0.6.  -LEFT Hip Total: BMD: 0.778 g/cm2. T-score: -1.8. Z-score: -0.7.  -LEFT Hip Femoral neck: BMD: 0.802 g/cm2. T-score: -1.7. Z-score: -0.3.      WHO T-SCORE CRITERIA  -Normal: T score at or above -1 SD  -Osteopenia: T score between -1 and -2.5 SD  -Osteoporosis: T score at or below -2.5 SD    The World Health Organization (WHO) criteria is applicable to perimenopausal females, postmenopausal females, and men aged 50 years or older.    TBS RESULTS  -Lumbar Spine TBS: L1-L4: TBS T-score: -2.7.TBS Z-score: -0.8.    The TBS is a DXA derived measurement for fracture risk assessment, and reflects the structural condition of the bone microarchitecture. It can be used to adjust WHO Fracture Risk Assessment Tool (FRAX) probability of fracture in postmenopausal women and  older men. The calculated probabilities of fracture have been shown to be more accurate when computed with the TBS.    INTERVAL CHANGE  -There has been a 13.2% increase in lumbar spine BMD. This may be partially due to increased degenerative changes.  -There has been a 1.4% decrease in bilateral hip BMD.      FRACTURE RISK  -FRAX Results: The 10 year probability of major osteoporotic fracture is 31.9%, and of hip fracture is 2.9%, based on right femoral neck BMD.  -TBS-adjusted FRAX Results: The 10 year probability of major osteoporotic fracture is 32.2%, and of hip fracture is 3.3%.    RECOMMENDATIONS  Consider treatment if major osteoporotic fracture score is greater than or equal to 20%, and if the hip fracture score is greater than or equal to 3%.    Impression  IMPRESSION: Low bone density (OSTEOPENIA). T score meets the WHO criteria for low bone density (osteopenia) at one or more measured sites. The risk of osteoporotic fracture increases approximately two-fold for each standard deviation decrease in T-score.        HPI  Patient was diagnosed via bone density report with osteoporosis several years ago.  Patient had been on Boniva  for about 5 years, which was stopped in fall 2021 about 1 year ago. The effect of Boniva treatment on bone density was felt to be insuffient.  Patient briefly was treated with calcitonin nasal spray.  Patient was switched to Prolia.  Received first injection this May or June 2022.  This was given in her primary ca    Kymberly Francisco MD

## 2023-04-05 NOTE — PATIENT INSTRUCTIONS
"Call infusion center for Prolia injection      To schedule a lab appointment,  Either use [employer]  Or call as below    Lab    General [phone]   [facility] [phone]   [place] [phone]   [facility]  [phone]   [facility] [phone]   [place] [phone]   [facility]) [phone]   [facility] Walk-In Only   [place] [phone]   [place] [phone]   [place] [phone]   [facility] [phone]     Please reach out to the following centers to schedule your imaging appointment:       Imaging (DEXA, CT, MRI, XRAY)    [facility] ([facility], [place]) [phone]   [facility] ([place], [place]) [phone]   [facility] ([facility]) [phone]   [facility] ([facility]) [phone]       To get a \"Good Mireya Estimate\" for out of pocket costs: tel [phone]    "

## 2023-04-24 ENCOUNTER — LAB REQUISITION (OUTPATIENT)
Dept: LAB | Facility: CLINIC | Age: 63
End: 2023-04-24

## 2023-04-24 DIAGNOSIS — E78.5 HYPERLIPIDEMIA, UNSPECIFIED: ICD-10-CM

## 2023-04-24 DIAGNOSIS — E03.9 HYPOTHYROIDISM, UNSPECIFIED: ICD-10-CM

## 2023-04-24 LAB
ALBUMIN SERPL BCG-MCNC: 4.7 G/DL (ref 3.5–5.2)
ALP SERPL-CCNC: 35 U/L (ref 35–104)
ALT SERPL W P-5'-P-CCNC: 19 U/L (ref 10–35)
ANION GAP SERPL CALCULATED.3IONS-SCNC: 15 MMOL/L (ref 7–15)
AST SERPL W P-5'-P-CCNC: 20 U/L (ref 10–35)
BILIRUB SERPL-MCNC: 0.2 MG/DL
BUN SERPL-MCNC: 12.4 MG/DL (ref 8–23)
CALCIUM SERPL-MCNC: 9.8 MG/DL (ref 8.8–10.2)
CHLORIDE SERPL-SCNC: 102 MMOL/L (ref 98–107)
CHOLEST SERPL-MCNC: 221 MG/DL
CREAT SERPL-MCNC: 1.17 MG/DL (ref 0.51–0.95)
DEPRECATED HCO3 PLAS-SCNC: 22 MMOL/L (ref 22–29)
GFR SERPL CREATININE-BSD FRML MDRD: 52 ML/MIN/1.73M2
GLUCOSE SERPL-MCNC: 90 MG/DL (ref 70–99)
HDLC SERPL-MCNC: 87 MG/DL
LDLC SERPL CALC-MCNC: 121 MG/DL
NONHDLC SERPL-MCNC: 134 MG/DL
POTASSIUM SERPL-SCNC: 4.1 MMOL/L (ref 3.4–5.3)
PROT SERPL-MCNC: 7.1 G/DL (ref 6.4–8.3)
SODIUM SERPL-SCNC: 139 MMOL/L (ref 136–145)
TRIGL SERPL-MCNC: 66 MG/DL
TSH SERPL DL<=0.005 MIU/L-ACNC: 0.64 UIU/ML (ref 0.3–4.2)

## 2023-04-24 PROCEDURE — 80061 LIPID PANEL: CPT | Performed by: FAMILY MEDICINE

## 2023-04-24 PROCEDURE — 80053 COMPREHEN METABOLIC PANEL: CPT | Performed by: FAMILY MEDICINE

## 2023-04-24 PROCEDURE — 84443 ASSAY THYROID STIM HORMONE: CPT | Performed by: FAMILY MEDICINE

## 2023-04-29 ENCOUNTER — HEALTH MAINTENANCE LETTER (OUTPATIENT)
Age: 63
End: 2023-04-29

## 2023-05-10 ENCOUNTER — LAB REQUISITION (OUTPATIENT)
Dept: LAB | Facility: CLINIC | Age: 63
End: 2023-05-10

## 2023-05-10 DIAGNOSIS — Z12.4 ENCOUNTER FOR SCREENING FOR MALIGNANT NEOPLASM OF CERVIX: ICD-10-CM

## 2023-05-10 PROCEDURE — 87624 HPV HI-RISK TYP POOLED RSLT: CPT | Performed by: OBSTETRICS & GYNECOLOGY

## 2023-05-10 PROCEDURE — G0123 SCREEN CERV/VAG THIN LAYER: HCPCS | Performed by: OBSTETRICS & GYNECOLOGY

## 2023-05-15 LAB
BKR LAB AP GYN ADEQUACY: NORMAL
BKR LAB AP GYN INTERPRETATION: NORMAL
BKR LAB AP HPV REFLEX: NORMAL
BKR LAB AP LMP: NORMAL
BKR LAB AP PREVIOUS ABNL DX: NORMAL
BKR LAB AP PREVIOUS ABNORMAL: NORMAL
PATH REPORT.COMMENTS IMP SPEC: NORMAL
PATH REPORT.COMMENTS IMP SPEC: NORMAL
PATH REPORT.RELEVANT HX SPEC: NORMAL

## 2023-05-16 LAB
HUMAN PAPILLOMA VIRUS 16 DNA: NEGATIVE
HUMAN PAPILLOMA VIRUS 18 DNA: NEGATIVE
HUMAN PAPILLOMA VIRUS FINAL DIAGNOSIS: NORMAL
HUMAN PAPILLOMA VIRUS OTHER HR: NEGATIVE

## 2023-05-18 ENCOUNTER — HOSPITAL ENCOUNTER (OUTPATIENT)
Dept: ULTRASOUND IMAGING | Facility: HOSPITAL | Age: 63
Discharge: HOME | End: 2023-05-18
Attending: OBSTETRICS & GYNECOLOGY | Admitting: OBSTETRICS & GYNECOLOGY
Payer: COMMERCIAL

## 2023-05-18 DIAGNOSIS — Z82.79 FAMILY HISTORY OF OTHER CONGENITAL MALFORMATIONS, DEFORMATIONS AND CHROMOSOMAL ABNORMALITIES: ICD-10-CM

## 2023-05-18 PROCEDURE — 76536 US EXAM OF HEAD AND NECK: CPT

## 2023-10-20 ENCOUNTER — HOSPITAL ENCOUNTER (OUTPATIENT)
Dept: MRI IMAGING | Facility: HOSPITAL | Age: 63
Discharge: HOME | End: 2023-10-20
Attending: OBSTETRICS & GYNECOLOGY | Admitting: OBSTETRICS & GYNECOLOGY
Payer: COMMERCIAL

## 2023-10-20 DIAGNOSIS — Z82.79 FAMILY HISTORY OF OTHER CONGENITAL MALFORMATIONS, DEFORMATIONS AND CHROMOSOMAL ABNORMALITIES: ICD-10-CM

## 2023-10-20 PROCEDURE — 77049 MRI BREAST C-+ W/CAD BI: CPT

## 2023-10-20 PROCEDURE — A9585 GADOBUTROL INJECTION: HCPCS | Mod: JZ | Performed by: OBSTETRICS & GYNECOLOGY

## 2023-10-20 PROCEDURE — 255N000002 HC RX 255 OP 636: Mod: JZ | Performed by: OBSTETRICS & GYNECOLOGY

## 2023-10-20 RX ORDER — GADOBUTROL 604.72 MG/ML
0.1 INJECTION INTRAVENOUS ONCE
Status: COMPLETED | OUTPATIENT
Start: 2023-10-20 | End: 2023-10-20

## 2023-10-20 RX ADMIN — GADOBUTROL 8 ML: 604.72 INJECTION INTRAVENOUS at 10:43

## 2023-10-26 ENCOUNTER — LAB REQUISITION (OUTPATIENT)
Dept: LAB | Facility: CLINIC | Age: 63
End: 2023-10-26

## 2023-10-26 DIAGNOSIS — R79.89 OTHER SPECIFIED ABNORMAL FINDINGS OF BLOOD CHEMISTRY: ICD-10-CM

## 2023-10-26 PROCEDURE — 80053 COMPREHEN METABOLIC PANEL: CPT | Performed by: FAMILY MEDICINE

## 2023-10-27 LAB
ALBUMIN SERPL BCG-MCNC: 4.4 G/DL (ref 3.5–5.2)
ALP SERPL-CCNC: 29 U/L (ref 35–104)
ALT SERPL W P-5'-P-CCNC: 22 U/L (ref 0–50)
ANION GAP SERPL CALCULATED.3IONS-SCNC: 13 MMOL/L (ref 7–15)
AST SERPL W P-5'-P-CCNC: 19 U/L (ref 0–45)
BILIRUB SERPL-MCNC: 0.2 MG/DL
BUN SERPL-MCNC: 12.2 MG/DL (ref 8–23)
CALCIUM SERPL-MCNC: 9.5 MG/DL (ref 8.8–10.2)
CHLORIDE SERPL-SCNC: 104 MMOL/L (ref 98–107)
CREAT SERPL-MCNC: 0.96 MG/DL (ref 0.51–0.95)
DEPRECATED HCO3 PLAS-SCNC: 22 MMOL/L (ref 22–29)
EGFRCR SERPLBLD CKD-EPI 2021: 66 ML/MIN/1.73M2
GLUCOSE SERPL-MCNC: 89 MG/DL (ref 70–99)
POTASSIUM SERPL-SCNC: 4 MMOL/L (ref 3.4–5.3)
PROT SERPL-MCNC: 7.2 G/DL (ref 6.4–8.3)
SODIUM SERPL-SCNC: 139 MMOL/L (ref 135–145)

## 2023-12-07 DIAGNOSIS — Z92.29 PERSONAL HISTORY OF DRUG THERAPY: Primary | ICD-10-CM

## 2023-12-28 ENCOUNTER — OFFICE VISIT (OUTPATIENT)
Dept: PHARMACY | Facility: PHYSICIAN GROUP | Age: 63
End: 2023-12-28
Payer: COMMERCIAL

## 2023-12-28 DIAGNOSIS — G43.909 MIGRAINE WITHOUT STATUS MIGRAINOSUS, NOT INTRACTABLE, UNSPECIFIED MIGRAINE TYPE: ICD-10-CM

## 2023-12-28 DIAGNOSIS — J32.9 RECURRENT SINUS INFECTIONS: ICD-10-CM

## 2023-12-28 DIAGNOSIS — M81.0 OSTEOPOROSIS WITHOUT CURRENT PATHOLOGICAL FRACTURE, UNSPECIFIED OSTEOPOROSIS TYPE: ICD-10-CM

## 2023-12-28 DIAGNOSIS — R51.9 FACIAL PAIN: ICD-10-CM

## 2023-12-28 DIAGNOSIS — M79.7 FIBROMYALGIA: Primary | ICD-10-CM

## 2023-12-28 DIAGNOSIS — F41.9 ANXIETY: ICD-10-CM

## 2023-12-28 DIAGNOSIS — K21.9 GASTROESOPHAGEAL REFLUX DISEASE WITHOUT ESOPHAGITIS: ICD-10-CM

## 2023-12-28 DIAGNOSIS — K50.919 CROHN'S DISEASE WITH COMPLICATION, UNSPECIFIED GASTROINTESTINAL TRACT LOCATION (H): ICD-10-CM

## 2023-12-28 PROCEDURE — 99207 PR NO CHARGE LOS: CPT | Performed by: PHARMACIST

## 2023-12-28 NOTE — PROGRESS NOTES
Medication Therapy Management (MTM) Encounter    ASSESSMENT:                            Medication Adherence/Access:   No issues identified. She does have several specialists prescribing medications so MTM to help with reconciling medication list is recommended.     Fibromyalgia, Back Pain:    Currently on duplicate SNRI therapy with milnacipran (Savella) and venlafaxine. Given cost issues with Savella she may benefit from trying to switch to single SNRI regimen targeted for both fibromyalgia and anxiety. It does not appear she has been on duloxetine so this may be a good option as it has more evidence in use for fibromyalgia. Alternatively, could try to titrate up venlafaxine to see if that as monotherapy would help both conditions. Also could consider continuing gapapentin or switching this to pregabalin as an adjunct for fibromyalgia pain after she undergoes planned surgical procedure. Current renal function is appropriate for current celecoxib dose.     Anxiety:  As noted above, currently on duplicate SNRI therapy with Savella and venlafaxine. See above recommendations for switching both to twice a day duloxetine or optimizing venlafaxine with tapering Savella.     Sinus Infection:    Stable.     Osteoporosis:   Stable. Follow-up plan in place with endocrinology.     GERD:   Stable. Follow-up plan in place with GI.     Crohn's vs Ulcerative Colitis:   Stable. She may benefit from  her bedtime sucralfate dose from other medications. Consider taking other medications waiting 30-60 minutes then taking bedtime sucralfate dose.     Hypothyroidism    Stable. Last TSH is within normal limits.     Migraine, Facial Pain:   Stable. Neurology clinic is working on alternative for Ajovy that is preferred by insurance. She has adequate supply for this month.     Supplements:   Stable.     PLAN:                            Fibromyalgia/Pain:  Consider tapering Savella and venlafaxine and switch to duloxetine  (Cymbalta). Savella and venlfaxine are in the same medication family. Duloxetine (Cymbalta) is also in this family but has more evidence for being used in fibromyalgia. Alternatively, you could wean off Savella and increase venlfaxine dose to see if this is helpful enough on its own for both anxiety and fibromyalgia pain.   Consider using pregabalin (Lyrica) or continuing gabapentin if you continue to have pain without Savella.      Migraines:  Work with your neurologist to find a covered alternative for Ajovy next year    Other medications:  Recommend  your other bedtime medicines from sucralfate to make sure they are absorbed fully. Take your other medicines, then wait 30-60 minutes to take sucralfate.     Follow-up: schedule with Dr. Esteban to discuss taper plan and follow-up further, MTM to help with updating medications and assist with taper once you come up with plan    SUBJECTIVE/OBJECTIVE:                          Sofiya Mason is a 63 year old female coming in for an initial visit. She was referred to me from Dr. Esteban.      Reason for visit: Medication review- cost for Savella is not affordable and wants to discuss other options.    Allergies/ADRs: Reviewed in chart  Past Medical History: Reviewed in chart  Tobacco: She reports that she has never smoked. She has never used smokeless tobacco.  Alcohol: none  Specialty providers:  Neurology- Kristy Beltran MD  GI- Kendall Kunz MD  Ortho- Jamey Montesinos MD  Endo- Kymberly Francisco MD  ENT- Arin Diggs MD    Medication Adherence/Access:   No issues reported. She has medications prescribed by several different specialty providers. She does not miss any medication doses. She takes medications several times a day. She has Medicare insurance. Savella is not covered by her insurance and not able to get approved for exception, cost is too high for her to continue this.     Fibromyalgia, Back Pain:    Savella 100 mg twice a day- taking for many years  Celecoxib  200 mg once daily- reduced from twice a day   Venlafaxine 150 mg once daily at bedtime- using for anxiety  Gabapentin 300 mg at bedtime- ortho provider, temporary while waiting for surgery  Tizanidine 4 mg three times a day as needed - ortho provider, temporary while waiting for surgery    Savella was started by a provider for her fibromyalgia many years ago. Coverage for this has not been great and now on Medicare she has to pay out of pocket. She does think it has been helpful. She does not think she ever was tried on duloxetine. She also is on venlafaxine but reports this is for anxiety. She did not know that it was in the same family of medicine as Lauren. She has not been on higher venlafaxine dose. She does not report any side effects.     Gabapentin and tizanidine last prescribed by Jamey Guaman MD, orthopedic provider, as short term before having surgery. She has not been on gabapentin or pregabalin for fibromyalgia. Currently is only taking gabapentin at bedtime because she isn't sure if she would be too drowsy if she takes it in the day. She is able to take twice a day per the prescription.       Creatinine   Date Value Ref Range Status   10/26/2023 0.96 (H) 0.51 - 0.95 mg/dL Final   03/14/2012 0.77 0.52 - 1.04 mg/dL Final     GFR Estimate   Date Value Ref Range Status   10/26/2023 66 >60 mL/min/1.73m2 Final   03/14/2012 78 >60 mL/min/1.7m2 Final     Anxiety:  Venlafaxine 150 mg once daily.   Patient reports no current medication side effects.  She does feel like this has been helpful for anxiety related to her health and her 's health. She reports this was started initially (12/2021) after being hospitalized with COVID and her  has more health issues that were causing her more stress. She did not realize this medicine was in the same family as Laurne.     Sinus Infection:    Cephalexin 500 mg twice a day for 10 days  Started by ENT. She started 10 day course on 12/23. She is having  some digestive side effects but no other issues reported.       Osteoporosis:   calcium 600/Vitamin D 1 tablet twice a day   Vitamin D 1000 mg once daily   denosumab (Prolia) 60 mg subcutaneous every 6 months   Patient is not experiencing side effects. She follows with endocrinology at [facility].   DEXA History: 3/29/2023  Risk factors: post-menopausal  chronic PPI use  Last vitamin D level:  Lab Results   Component Value Date    VITDT 47 05/09/2022     GERD:   Omeprazole 40 mg twice daily   Sucralfate 1 g four times a day before meals and bedtime  She follows with GI specialist.  Patient reports no current symptoms.   Patient feels that current regimen is effective. She has talked with her GI specialist about potential risks of being on high dose long term but they thought benefits outweighed the risks for her. She does have endoscopy exams regularly. Patient does not have a history of GI bleed.    Crohn's vs Ulcerative Colitis:   Mesalamine 1.2 mg twice a day   Sucralfate 1 g four times a day before meals and bedtime  Follows with GI specialist. Symptoms have been controlled well. She does not separate her bedtime sucralfate dose from her other medications currently. She has a history of Hep C and underwent treatment for this through GI clinic.       Hypothyroidism      Levothyroxine 112 mcg daily.   Patient is having the following symptoms: none. She does separate her dose in the morning before other medications.       TSH   Date Value Ref Range Status   04/24/2023 0.64 0.30 - 4.20 uIU/mL Final   12/02/2021 0.39 0.30 - 5.00 uIU/mL Final       Migraine, Facial Pain:   Preventive medications  Ajovy 225 mg every once monthly- needing to change next year per insurance  Topiramate 100 mg morning and 200 mg at night- facial pain  Venlafaxine  mg once daily- anxiety/mood    Acute medications  None    Follows with neurology, Kristy Beltran MD. They are aware Ajovy will not be covered next year and are in process  of determining what will be covered. This type of medicine has helped reduce her headache days. No side effects reported. She is also on topiramate but feels like this is also being used for facial pain. She may have some issues with word finding but not significant and does not have other cognitive side effects. She doesn't like that she has to take 1/2 tablet in the morning rather than get a prescription for 100 mg and 200 mg tablets but she has managed with this. Venlafaxine was started to help with anxiety but she isn't sure if it also is helping with migraine control.     Supplements:   Calcium-vitamin D 1 tablet twice a day   Vitamin D 1000 units daily  Biotin 5000 mcg once daily   No reported issues at this time.         Today's Vitals: /80 (BP Location: Right arm, Patient Position: Sitting, Cuff Size: Adult Regular)   ----------------      I spent 60 minutes with this patient today. All changes were made via collaborative practice agreement with Kathy Esteban MD. A copy of the visit note was provided to the patient's provider(s).    A summary of these recommendations was sent via [employer].    Katia Willis, PharmD, BCACP  Medication Therapy Management Pharmacist  [facility]  Pager: [phone]           Medication Therapy Recommendations  Fibromyalgia    Current Medication: milnacipran (SAVELLA) 100 MG TABS tablet   Rationale: Duplicate Therapy - Unnecessary medication therapy - Indication   Recommendation: Change Medication - DULoxetine 30 MG capsule   Status: Contact Provider - Awaiting Response

## 2023-12-29 RX ORDER — LYSINE HCL 500 MG
1 TABLET ORAL 2 TIMES DAILY
COMMUNITY

## 2024-01-02 VITALS — SYSTOLIC BLOOD PRESSURE: 118 MMHG | DIASTOLIC BLOOD PRESSURE: 80 MMHG

## 2024-01-02 RX ORDER — MESALAMINE 1.2 G/1
1200 TABLET, DELAYED RELEASE ORAL 2 TIMES DAILY
COMMUNITY

## 2024-01-02 RX ORDER — VENLAFAXINE HYDROCHLORIDE 150 MG/1
150 CAPSULE, EXTENDED RELEASE ORAL DAILY
COMMUNITY
End: 2024-04-09

## 2024-01-02 RX ORDER — BIOTIN 5 MG
1 TABLET ORAL DAILY
COMMUNITY
Start: 2018-02-01

## 2024-01-02 RX ORDER — FREMANEZUMAB-VFRM 225 MG/1.5ML
225 INJECTION SUBCUTANEOUS
COMMUNITY
End: 2024-04-09

## 2024-01-02 RX ORDER — GABAPENTIN 300 MG/1
300 CAPSULE ORAL 2 TIMES DAILY PRN
COMMUNITY

## 2024-01-02 NOTE — PATIENT INSTRUCTIONS
Recommendations from today's MTM visit:                                                    MTM (medication therapy management) is a service provided by a clinical pharmacist designed to help you get the most of out of your medicines.   Today we reviewed what your medicines are for, how to know if they are working, that your medicines are safe and how to make your medicine regimen as easy as possible.      Fibromyalgia/Pain:  Consider tapering Savella and venlafaxine and switch to duloxetine (Cymbalta). Savella and venlfaxine are in the same medication family. Duloxetine (Cymbalta) is also in this family but has more evidence for being used in fibromyalgia. Alternatively, you could wean off Savella and increase venlfaxine dose to see if this is helpful enough on its own for both anxiety and fibromyalgia pain.   Consider using pregabalin (Lyrica) or continuing gabapentin if you continue to have pain without Savella.      Migraines:  Work with your neurologist to find a covered alternative for Ajovy next year    Other medications:  Recommend  your other bedtime medicines from sucralfate to make sure they are absorbed fully. Take your other medicines, then wait 30-60 minutes to take sucralfate.     Follow-up: schedule with Dr. Esteban to discuss taper plan and follow-up further, MTM to help with updating medications and assist with taper once you come up with plan    It was great speaking with you today.  I value your experience and would be very thankful for your time in providing feedback in our clinic survey. In the next few days, you may receive an email or text message from [employer] with a link to a survey related to your clinical pharmacist.    To schedule another MTM appointment, please call [phone].    My Clinical Pharmacist's contact information:                                                      Please feel free to contact me with any questions or concerns you have.      Katia Willsi, AidenD,  BCACP  Medication Therapy Management Pharmacist  [facility]  Voicemail: [phone]

## 2024-01-03 RX ORDER — DIPHENHYDRAMINE HYDROCHLORIDE 50 MG/ML
50 INJECTION INTRAMUSCULAR; INTRAVENOUS
Status: CANCELLED
Start: 2024-07-01

## 2024-01-03 RX ORDER — EPINEPHRINE 1 MG/ML
0.3 INJECTION, SOLUTION INTRAMUSCULAR; SUBCUTANEOUS EVERY 5 MIN PRN
Status: CANCELLED | OUTPATIENT
Start: 2024-07-01

## 2024-01-03 RX ORDER — ALBUTEROL SULFATE 90 UG/1
1-2 AEROSOL, METERED RESPIRATORY (INHALATION)
Status: CANCELLED
Start: 2024-07-01

## 2024-01-03 RX ORDER — METHYLPREDNISOLONE SODIUM SUCCINATE 125 MG/2ML
125 INJECTION, POWDER, LYOPHILIZED, FOR SOLUTION INTRAMUSCULAR; INTRAVENOUS
Status: CANCELLED
Start: 2024-07-01

## 2024-01-03 RX ORDER — ALBUTEROL SULFATE 0.83 MG/ML
2.5 SOLUTION RESPIRATORY (INHALATION)
Status: CANCELLED | OUTPATIENT
Start: 2024-07-01

## 2024-01-03 RX ORDER — MEPERIDINE HYDROCHLORIDE 25 MG/ML
25 INJECTION INTRAMUSCULAR; INTRAVENOUS; SUBCUTANEOUS EVERY 30 MIN PRN
Status: CANCELLED | OUTPATIENT
Start: 2024-07-01

## 2024-01-04 ENCOUNTER — INFUSION THERAPY VISIT (OUTPATIENT)
Dept: INFUSION THERAPY | Facility: CLINIC | Age: 64
End: 2024-01-04
Attending: INTERNAL MEDICINE
Payer: MEDICARE

## 2024-01-04 ENCOUNTER — APPOINTMENT (OUTPATIENT)
Dept: LAB | Facility: CLINIC | Age: 64
End: 2024-01-04
Payer: MEDICARE

## 2024-01-04 VITALS
RESPIRATION RATE: 16 BRPM | WEIGHT: 170.8 LBS | SYSTOLIC BLOOD PRESSURE: 148 MMHG | DIASTOLIC BLOOD PRESSURE: 88 MMHG | TEMPERATURE: 97.8 F | OXYGEN SATURATION: 100 % | HEART RATE: 79 BPM

## 2024-01-04 DIAGNOSIS — Z92.29 PERSONAL HISTORY OF DRUG THERAPY: ICD-10-CM

## 2024-01-04 DIAGNOSIS — M81.0 OSTEOPOROSIS WITHOUT CURRENT PATHOLOGICAL FRACTURE, UNSPECIFIED OSTEOPOROSIS TYPE: Primary | ICD-10-CM

## 2024-01-04 LAB
CALCIUM SERPL-MCNC: 9.2 MG/DL (ref 8.8–10.2)
CREAT SERPL-MCNC: 0.94 MG/DL (ref 0.51–0.95)
EGFRCR SERPLBLD CKD-EPI 2021: 67 ML/MIN/1.73M2

## 2024-01-04 PROCEDURE — 36415 COLL VENOUS BLD VENIPUNCTURE: CPT | Performed by: INTERNAL MEDICINE

## 2024-01-04 PROCEDURE — 82565 ASSAY OF CREATININE: CPT | Performed by: INTERNAL MEDICINE

## 2024-01-04 PROCEDURE — 250N000011 HC RX IP 250 OP 636: Mod: JZ | Performed by: INTERNAL MEDICINE

## 2024-01-04 PROCEDURE — 96372 THER/PROPH/DIAG INJ SC/IM: CPT | Performed by: INTERNAL MEDICINE

## 2024-01-04 PROCEDURE — 82310 ASSAY OF CALCIUM: CPT | Performed by: INTERNAL MEDICINE

## 2024-01-04 RX ADMIN — DENOSUMAB 60 MG: 60 INJECTION SUBCUTANEOUS at 11:58

## 2024-01-04 ASSESSMENT — PAIN SCALES - GENERAL: PAINLEVEL: MODERATE PAIN (4)

## 2024-01-04 NOTE — NURSING NOTE
Chief Complaint   Patient presents with    Blood Draw     Vitals, blood drawn via VPT by LPN. Pt checked into appt.      PATRICIO Marr LPN

## 2024-01-04 NOTE — PROGRESS NOTES
Infusion Nursing Note:  Autumn Mason presents today for Prolia.    Patient seen by provider today: No   present during visit today: Not Applicable.    Note:   Injection given subcutaneously into right upper arm    Intravenous Access:  No Intravenous access needed at this visit. Labs drawn prior to appointment by [facility] lab.    Treatment Conditions:  Verify that patient is taking calcium/vitamin D supplements: Patient confirms   May proceed with denosumab (Prolia) if labs are within normal limits: Results reviewed, labs MET treatment parameters, ok to proceed with treatment.   Latest Reference Range & Units 01/04/24 11:10   Creatinine 0.51 - 0.95 mg/dL 0.94   GFR Estimate >60 mL/min/1.73m2 67   Calcium 8.8 - 10.2 mg/dL 9.2     Post Infusion Assessment:  Patient tolerated injection without incident.  Site patent and intact, free from redness, edema or discomfort.     Discharge Plan:   Patient and/or family verbalized understanding of discharge instructions and all questions answered.  AVS to patient via [email or web address].  Patient will return in 6 months for next appointment.   Patient discharged in stable condition accompanied by: self.  Departure Mode: Ambulatory.    Administrations This Visit       denosumab (PROLIA) injection 60 mg       Admin Date  01/04/2024 Action  $Given Dose  60 mg Route  Subcutaneous Documented By  Joceline Park RN                BP (!) 148/88   Pulse 79   Temp 97.8  F (36.6  C) (Oral)   Resp 16   Wt 77.5 kg (170 lb 12.8 oz)   SpO2 100%     Joceline Park RN

## 2024-01-04 NOTE — PATIENT INSTRUCTIONS
Dear Autumn Mason    Thank you for choosing [facility] ([facility]) for your injection.  The following information is a summary of our appointment as well as important reminders.      We look forward in seeing you on your next appointment here at [facility] ([facility]).  Please don t hesitate to call us at [phone] to reschedule any of your appointments or to speak with one of the [facility] registered nurses.  It was a pleasure taking care of you today.    Sincerely,    [facility]  [address] [place] [address]  [address]  Phone:  [phone]

## 2024-01-11 ENCOUNTER — LAB REQUISITION (OUTPATIENT)
Dept: LAB | Facility: CLINIC | Age: 64
End: 2024-01-11
Payer: MEDICARE

## 2024-01-11 DIAGNOSIS — J02.9 ACUTE PHARYNGITIS, UNSPECIFIED: ICD-10-CM

## 2024-01-11 DIAGNOSIS — E78.5 HYPERLIPIDEMIA, UNSPECIFIED: ICD-10-CM

## 2024-01-11 PROCEDURE — 80048 BASIC METABOLIC PNL TOTAL CA: CPT | Mod: ORL | Performed by: PHYSICIAN ASSISTANT

## 2024-01-11 PROCEDURE — 87081 CULTURE SCREEN ONLY: CPT | Mod: ORL | Performed by: PHYSICIAN ASSISTANT

## 2024-01-12 LAB
ANION GAP SERPL CALCULATED.3IONS-SCNC: 11 MMOL/L (ref 7–15)
BUN SERPL-MCNC: 12.5 MG/DL (ref 8–23)
CALCIUM SERPL-MCNC: 9.4 MG/DL (ref 8.8–10.2)
CHLORIDE SERPL-SCNC: 104 MMOL/L (ref 98–107)
CREAT SERPL-MCNC: 1.02 MG/DL (ref 0.51–0.95)
DEPRECATED HCO3 PLAS-SCNC: 23 MMOL/L (ref 22–29)
EGFRCR SERPLBLD CKD-EPI 2021: 61 ML/MIN/1.73M2
GLUCOSE SERPL-MCNC: 91 MG/DL (ref 70–99)
POTASSIUM SERPL-SCNC: 3.9 MMOL/L (ref 3.4–5.3)
SODIUM SERPL-SCNC: 138 MMOL/L (ref 135–145)

## 2024-01-14 LAB — BACTERIA SPEC CULT: NORMAL

## 2024-02-13 ENCOUNTER — ANCILLARY PROCEDURE (OUTPATIENT)
Dept: MAMMOGRAPHY | Facility: CLINIC | Age: 64
End: 2024-02-13
Attending: FAMILY MEDICINE
Payer: MEDICARE

## 2024-02-13 DIAGNOSIS — Z12.31 VISIT FOR SCREENING MAMMOGRAM: ICD-10-CM

## 2024-02-13 PROCEDURE — 77067 SCR MAMMO BI INCL CAD: CPT | Mod: TC | Performed by: RADIOLOGY

## 2024-02-27 ENCOUNTER — HOSPITAL ENCOUNTER (OUTPATIENT)
Dept: ULTRASOUND IMAGING | Facility: HOSPITAL | Age: 64
Discharge: HOME | End: 2024-02-27
Attending: STUDENT IN AN ORGANIZED HEALTH CARE EDUCATION/TRAINING PROGRAM | Admitting: STUDENT IN AN ORGANIZED HEALTH CARE EDUCATION/TRAINING PROGRAM
Payer: MEDICARE

## 2024-02-27 DIAGNOSIS — Z82.79 FAMILY HISTORY OF COWDEN SYNDROME: ICD-10-CM

## 2024-02-27 PROCEDURE — 76536 US EXAM OF HEAD AND NECK: CPT

## 2024-03-29 NOTE — PROGRESS NOTES
03/29/24 10:29 AM  PATIENT LAB/IMAGING STATUS : No pending lab orders         Endocrinology and Diabetes Clinic         Follow up for Osteoporosis      Assessment:  63 postmenopausal woman with diagnosis of osteoporosis and hypothyroidism    Osteoporosis  Middle-aged postmenopausal woman with diagnosis of osteoporosis now on Prolia treatment since 5/2022.  Tolerates these well.  DXA from 2023 showed improvement very significantly at the spine, and to a small degree at the bilateral femur.  Please note that contrary to the report there has been a 1.4% increase in the bilateral femur density, not a decrease.  Clinically patient is stable.     Hypothyroidism  Longstanding hypothyroidism on levothyroxine treatment.  Overall patient is euthyroid.  Recheck labs.  Takes levothyroxine as directed    Plan:   Continue Prolia injections #6 in 7/2024 at [place] [facility] [place]  Continue calcium supplements twice daily recommended to take them not in the morning  Continue levothyroxine 112 mcg    Recheck labs soon for TSH, alk phos, CTX soon    Follow-up with me in 1 year     The Longitudinal plan of care for hypothyroidism and osteoporosis was addressed during this visit. Due to added complexity of care, we will continue to support Autumn , and the subsequent management of this condition(s) and with the ongoing continuity of care of this condition.     Kymberly Francisco MD  Endocrinology and Diabetes  Telephone contact:  [facility] [place] [phone]  [place] [phone]        Interval history  - developed back pain summer 2023, received injections and had microdiscectomy in 1/24;   - had prolia in 7/2023 with PCP and 1/24 [place] [facility], tolerating this well    - had dental procedure with a crown in 2023, otherwise no problems    - weight stable, abdominal dyscomfort no    - since last visit falls no, fractures no     - Patient notes her mother was diagnosed with osteoporosis  per bone density, and her maternal grandmother had osteoporosis with large curvature    - patient has been busy with work, and taking care of     Most recen DXA scan 3/29/23 T-score -2.3 lumbar spine change +13% improvement    Osteoporosis medication since last visit: Prolia injection around 1/2024, 7/2023, 1/2023,  7/22; gets these not in the infusion center, however would like to switch to the infusion center at the [facility]    Calcium: supplement twice daily  Vitamin D: With calcium  Physical activity: Somewhat limited, patient is quite busy taking care of her  who is struggling with Alzheimer's disease    HPI  Boniva for about 5 years, which was stopped in fall 2021 about 1 year ago. The effect of Boniva treatment on bone density was felt to be insuffient.  Patient briefly was treated with calcitonin nasal spray.  Patient was switched to Prolia.  Received first injection this May or June 2022.      Past Medical and Past Surgical History:  Past Medical History:   Diagnosis Date    GERD (gastroesophageal reflux disease)     Hepatitis C, chronic (H)     Nosebleeds     Obstructive sleep apnea     Thyroid disease     hypothyroidism    Ulcerative colitis        Past Surgical History:   Procedure Laterality Date    APPENDECTOMY      ENT SURGERY      sinus surgery    GYN SURGERY      Bilateral Salpingectomy and Oopherectomy       Medications:   Current Outpatient Medications   Medication Sig Dispense Refill    Biotin (SUPER BIOTIN) 5 MG TABS Take 1 tablet by mouth daily      Calcium Carbonate-Vit D-Min (CALCIUM 600+D PLUS MINERALS) 600-400 MG-UNIT TABS Take 1 tablet by mouth 2 times daily      celecoxib (CELEBREX) 200 MG capsule Take 200 mg by mouth daily      Cholecalciferol (VITAMIN D) 1000 UNITS capsule Take 1 capsule by mouth daily.      denosumab (PROLIA) 60 MG/ML SOSY injection Inject 60 mg Subcutaneous every 6 months      fremanezumab-vfrm (AJOVY) SOSY subcutaneous Inject 225 mg Subcutaneous every 30  "days      gabapentin (NEURONTIN) 300 MG capsule Take 300 mg by mouth 2 times daily as needed (Pain)      levothyroxine (SYNTHROID/LEVOTHROID) 112 MCG tablet Take 1 tablet by mouth daily      mesalamine (LIALDA) 1.2 g DR tablet Take 1,200 mg by mouth 2 times daily      milnacipran (SAVELLA) 100 MG TABS tablet Take 100 mg by mouth 2 times daily      omeprazole (PRILOSEC) 40 MG capsule Take 40 mg by mouth 2 times daily      sucralfate (CARAFATE) 1 GM tablet Take 1 g by mouth 4 times daily 1-4 times per day      tiZANidine (ZANAFLEX) 4 MG tablet Take 4 mg by mouth 3 times daily as needed for muscle spasms      topiramate (TOPAMAX) 200 MG tablet Take 100 mg by mouth every morning And 200 mg evening      venlafaxine (EFFEXOR XR) 150 MG 24 hr capsule Take 150 mg by mouth daily         Allergies:   Allergies   Allergen Reactions    Fosamax Other (See Comments)     Body aches      Zonisamide Other (See Comments)     depression    Other Reaction(s): depression    Bacitracin Rash    Erythromycin Rash and Hives    Neomycin-Polymyxin B Gu Rash    Sulfa Antibiotics Rash       Social History     Tobacco Use    Smoking status: Never    Smokeless tobacco: Never   Substance Use Topics    Alcohol use: No       Physical Examination:  /84 (BP Location: Right arm, Patient Position: Sitting, Cuff Size: Adult Regular)   Pulse 79   Ht 1.626 m (5' 4\")   Wt 81.2 kg (179 lb)   SpO2 100%   BMI 30.73 kg/m      Wt Readings from Last 4 Encounters:  09/05/13 : 79.8 kg (176 lb)  08/22/13 : 72.6 kg (160 lb)  03/14/12 : 62.1 kg (137 lb)    General: Well appearing female in no distress, up and go quick  Eyes: EOMI. Sclerae and conjunctivae are clear.     Labs and Studies:   Lab Results   Component Value Date    DAYNE 9.4 01/11/2024    DAYNE 9.2 01/04/2024    DAYNE 9.5 10/26/2023    DAYNE 9.8 04/24/2023    ALBUMIN 4.4 10/26/2023    ALT 22 10/26/2023    PTHI 32 05/09/2022    AST 19 10/26/2023    BILITOTAL 0.2 10/26/2023    CR 1.02 (H) 01/11/2024    CR " 0.94 01/04/2024    CR 0.96 (H) 10/26/2023     01/11/2024    TSH 0.64 04/24/2023    ALKPHOS 29 (L) 10/26/2023    HGB 10.1 (L) 03/14/2012 5/9/22 25OHVitD 47    Results for orders placed during the hospital encounter of 03/29/23    Dexa hip/pelvis/spine    Narrative  EXAM: DX HIP/PELVIS/SPINE  LOCATION: [facility]  DATE/TIME: 3/29/2023 1:41 PM    INDICATION:  Osteoporosis without current pathological fracture, unspecified osteoporosis type. Postmenopausal.  DEMOGRAPHICS: Age- 62 years. Gender- Female. Menopausal status- Postmenopausal.  COMPARISON: 10/14/2021  TECHNIQUE: Dual-energy x-ray absorptiometry (DXA) performed with routine technique. Trabecular bone score (TBS) analysis performed.    FINDINGS:    DXA RESULTS  -Lumbar Spine: L1-L4: BMD: 0.901 g/cm2. T-score: -2.3. Z-score: -0.8. Degenerative change may artifactually increase BMD.  -RIGHT Hip Total: BMD: 0.765 g/cm2. T-score: -1.9. Z-score: -0.8.  -RIGHT Hip Femoral neck: BMD: 0.753 g/cm2. T-score: -2.0. Z-score: -0.6.  -LEFT Hip Total: BMD: 0.778 g/cm2. T-score: -1.8. Z-score: -0.7.  -LEFT Hip Femoral neck: BMD: 0.802 g/cm2. T-score: -1.7. Z-score: -0.3.      WHO T-SCORE CRITERIA  -Normal: T score at or above -1 SD  -Osteopenia: T score between -1 and -2.5 SD  -Osteoporosis: T score at or below -2.5 SD    The World Health Organization (WHO) criteria is applicable to perimenopausal females, postmenopausal females, and men aged 50 years or older.    TBS RESULTS  -Lumbar Spine TBS: L1-L4: TBS T-score: -2.7.TBS Z-score: -0.8.    The TBS is a DXA derived measurement for fracture risk assessment, and reflects the structural condition of the bone microarchitecture. It can be used to adjust WHO Fracture Risk Assessment Tool (FRAX) probability of fracture in postmenopausal women and  older men. The calculated probabilities of fracture have been shown to be more accurate when computed with the TBS.    INTERVAL CHANGE  -There has been a  13.2% increase in lumbar spine BMD. This may be partially due to increased degenerative changes.  -There has been a 1.4% decrease in bilateral hip BMD.      FRACTURE RISK  -FRAX Results: The 10 year probability of major osteoporotic fracture is 31.9%, and of hip fracture is 2.9%, based on right femoral neck BMD.  -TBS-adjusted FRAX Results: The 10 year probability of major osteoporotic fracture is 32.2%, and of hip fracture is 3.3%.    RECOMMENDATIONS  Consider treatment if major osteoporotic fracture score is greater than or equal to 20%, and if the hip fracture score is greater than or equal to 3%.    Impression  IMPRESSION: Low bone density (OSTEOPENIA). T score meets the WHO criteria for low bone density (osteopenia) at one or more measured sites. The risk of osteoporotic fracture increases approximately two-fold for each standard deviation decrease in T-score.

## 2024-04-09 ENCOUNTER — OFFICE VISIT (OUTPATIENT)
Dept: ENDOCRINOLOGY | Facility: CLINIC | Age: 64
End: 2024-04-09
Payer: MEDICARE

## 2024-04-09 VITALS
HEIGHT: 64 IN | HEART RATE: 79 BPM | DIASTOLIC BLOOD PRESSURE: 84 MMHG | SYSTOLIC BLOOD PRESSURE: 132 MMHG | OXYGEN SATURATION: 100 % | BODY MASS INDEX: 30.56 KG/M2 | WEIGHT: 179 LBS

## 2024-04-09 DIAGNOSIS — R94.6 ABNORMAL FINDING ON THYROID FUNCTION TEST: ICD-10-CM

## 2024-04-09 DIAGNOSIS — M81.0 OSTEOPOROSIS WITHOUT CURRENT PATHOLOGICAL FRACTURE, UNSPECIFIED OSTEOPOROSIS TYPE: Primary | ICD-10-CM

## 2024-04-09 DIAGNOSIS — E55.9 VITAMIN D DEFICIENCY: ICD-10-CM

## 2024-04-09 PROCEDURE — G2211 COMPLEX E/M VISIT ADD ON: HCPCS | Performed by: INTERNAL MEDICINE

## 2024-04-09 PROCEDURE — 99214 OFFICE O/P EST MOD 30 MIN: CPT | Performed by: INTERNAL MEDICINE

## 2024-04-09 RX ORDER — DULOXETIN HYDROCHLORIDE 30 MG/1
30 CAPSULE, DELAYED RELEASE ORAL DAILY
COMMUNITY
Start: 2024-02-23

## 2024-04-09 ASSESSMENT — PAIN SCALES - GENERAL: PAINLEVEL: NO PAIN (0)

## 2024-04-09 NOTE — PATIENT INSTRUCTIONS
1. For scheduling osteoporosis treatment at the infusion center:    2. Lab check soon     3. DXA in year    Please call a [facility] at the   - [facility]  [phone] (will connect to [facility])  - [facility]      Additional infusion centers are also at [facility], [facility], [place], [place], [place], [place], [facility] [phone]   [place] [phone]   [place] [phone]   [place] [phone]   [place] [phone]   [place] [phone]   [facility] [phone]     [facility]: [phone]      Please call if therapy in an infusion center was discussed. This will trigger a prior authorization process.

## 2024-04-09 NOTE — NURSING NOTE
"Chief Complaint   Patient presents with    Osteoporosis     Vital signs:      BP: 132/84 Pulse: 79     SpO2: 100 %     Height: 162.6 cm (5' 4\") Weight: 81.2 kg (179 lb)  Estimated body mass index is 30.73 kg/m  as calculated from the following:    Height as of this encounter: 1.626 m (5' 4\").    Weight as of this encounter: 81.2 kg (179 lb).          "

## 2024-04-09 NOTE — LETTER
4/9/2024       RE: Autumn Mason  [address] [address]     Dear Colleague,    Thank you for referring your patient, Autumn Mason, to the [facility] [place] at [facility]. Please see a copy of my visit note below.    03/29/24 10:29 AM  PATIENT LAB/IMAGING STATUS : No pending lab orders         Endocrinology and Diabetes Clinic         Follow up for Osteoporosis      Assessment:  63 postmenopausal woman with diagnosis of osteoporosis and hypothyroidism    Osteoporosis  Middle-aged postmenopausal woman with diagnosis of osteoporosis now on Prolia treatment since 5/2022.  Tolerates these well.  DXA from 2023 showed improvement very significantly at the spine, and to a small degree at the bilateral femur.  Please note that contrary to the report there has been a 1.4% increase in the bilateral femur density, not a decrease.  Clinically patient is stable.     Hypothyroidism  Longstanding hypothyroidism on levothyroxine treatment.  Overall patient is euthyroid.  Recheck labs.  Takes levothyroxine as directed    Plan:   Continue Prolia injections #6 in 7/2024 at the [facility] [place]  Continue calcium supplements twice daily recommended to take them not in the morning  Continue levothyroxine 112 mcg    Recheck labs soon for TSH, alk phos, CTX soon    Follow-up with me in 1 year     The Longitudinal plan of care for hypothyroidism and osteoporosis was addressed during this visit. Due to added complexity of care, we will continue to support Autumn , and the subsequent management of this condition(s) and with the ongoing continuity of care of this condition.     Kymberly Francisco MD  Endocrinology and Diabetes  Telephone contact:  [facility] [place] [phone]  [facility] [phone]        Interval history  - developed back pain summer 2023, received injections and had  microdiscectomy in 1/24;   - had prolia in 7/2023 with PCP and 1/24 at the [facility], tolerating this well    - had dental procedure with a crown in 2023, otherwise no problems    - weight stable, abdominal dyscomfort no    - since last visit falls no, fractures no     - Patient notes her mother was diagnosed with osteoporosis per bone density, and her maternal grandmother had osteoporosis with large curvature    - patient has been busy with work, and taking care of     Most recen DXA scan 3/29/23 T-score -2.3 lumbar spine change +13% improvement    Osteoporosis medication since last visit: Prolia injection around 1/2024, 7/2023, 1/2023,  7/22; gets these not in the infusion center, however would like to switch to the infusion center at the [facility]    Calcium: supplement twice daily  Vitamin D: With calcium  Physical activity: Somewhat limited, patient is quite busy taking care of her  who is struggling with Alzheimer's disease    HPI  Boniva for about 5 years, which was stopped in fall 2021 about 1 year ago. The effect of Boniva treatment on bone density was felt to be insuffient.  Patient briefly was treated with calcitonin nasal spray.  Patient was switched to Prolia.  Received first injection this May or June 2022.      Past Medical and Past Surgical History:  Past Medical History:   Diagnosis Date    GERD (gastroesophageal reflux disease)     Hepatitis C, chronic (H)     Nosebleeds     Obstructive sleep apnea     Thyroid disease     hypothyroidism    Ulcerative colitis        Past Surgical History:   Procedure Laterality Date    APPENDECTOMY      ENT SURGERY      sinus surgery    GYN SURGERY      Bilateral Salpingectomy and Oopherectomy       Medications:   Current Outpatient Medications   Medication Sig Dispense Refill    Biotin (SUPER BIOTIN) 5 MG TABS Take 1 tablet by mouth daily      Calcium Carbonate-Vit D-Min (CALCIUM 600+D PLUS MINERALS) 600-400 MG-UNIT TABS Take 1 tablet by mouth 2 times daily       "celecoxib (CELEBREX) 200 MG capsule Take 200 mg by mouth daily      Cholecalciferol (VITAMIN D) 1000 UNITS capsule Take 1 capsule by mouth daily.      denosumab (PROLIA) 60 MG/ML SOSY injection Inject 60 mg Subcutaneous every 6 months      fremanezumab-vfrm (AJOVY) SOSY subcutaneous Inject 225 mg Subcutaneous every 30 days      gabapentin (NEURONTIN) 300 MG capsule Take 300 mg by mouth 2 times daily as needed (Pain)      levothyroxine (SYNTHROID/LEVOTHROID) 112 MCG tablet Take 1 tablet by mouth daily      mesalamine (LIALDA) 1.2 g DR tablet Take 1,200 mg by mouth 2 times daily      milnacipran (SAVELLA) 100 MG TABS tablet Take 100 mg by mouth 2 times daily      omeprazole (PRILOSEC) 40 MG capsule Take 40 mg by mouth 2 times daily      sucralfate (CARAFATE) 1 GM tablet Take 1 g by mouth 4 times daily 1-4 times per day      tiZANidine (ZANAFLEX) 4 MG tablet Take 4 mg by mouth 3 times daily as needed for muscle spasms      topiramate (TOPAMAX) 200 MG tablet Take 100 mg by mouth every morning And 200 mg evening      venlafaxine (EFFEXOR XR) 150 MG 24 hr capsule Take 150 mg by mouth daily         Allergies:   Allergies   Allergen Reactions    Fosamax Other (See Comments)     Body aches      Zonisamide Other (See Comments)     depression    Other Reaction(s): depression    Bacitracin Rash    Erythromycin Rash and Hives    Neomycin-Polymyxin B Gu Rash    Sulfa Antibiotics Rash       Social History     Tobacco Use    Smoking status: Never    Smokeless tobacco: Never   Substance Use Topics    Alcohol use: No       Physical Examination:  /84 (BP Location: Right arm, Patient Position: Sitting, Cuff Size: Adult Regular)   Pulse 79   Ht 1.626 m (5' 4\")   Wt 81.2 kg (179 lb)   SpO2 100%   BMI 30.73 kg/m      Wt Readings from Last 4 Encounters:  09/05/13 : 79.8 kg (176 lb)  08/22/13 : 72.6 kg (160 lb)  03/14/12 : 62.1 kg (137 lb)    General: Well appearing female in no distress, up and go quick  Eyes: EOMI. Sclerae and " conjunctivae are clear.     Labs and Studies:   Lab Results   Component Value Date    DAYNE 9.4 01/11/2024    DAYNE 9.2 01/04/2024    DAYNE 9.5 10/26/2023    DAYNE 9.8 04/24/2023    ALBUMIN 4.4 10/26/2023    ALT 22 10/26/2023    PTHI 32 05/09/2022    AST 19 10/26/2023    BILITOTAL 0.2 10/26/2023    CR 1.02 (H) 01/11/2024    CR 0.94 01/04/2024    CR 0.96 (H) 10/26/2023     01/11/2024    TSH 0.64 04/24/2023    ALKPHOS 29 (L) 10/26/2023    HGB 10.1 (L) 03/14/2012 5/9/22 25OHVitD 47    Results for orders placed during the hospital encounter of 03/29/23    Dexa hip/pelvis/spine    Narrative  EXAM: DX HIP/PELVIS/SPINE  LOCATION: [facility]  DATE/TIME: 3/29/2023 1:41 PM    INDICATION:  Osteoporosis without current pathological fracture, unspecified osteoporosis type. Postmenopausal.  DEMOGRAPHICS: Age- 62 years. Gender- Female. Menopausal status- Postmenopausal.  COMPARISON: 10/14/2021  TECHNIQUE: Dual-energy x-ray absorptiometry (DXA) performed with routine technique. Trabecular bone score (TBS) analysis performed.    FINDINGS:    DXA RESULTS  -Lumbar Spine: L1-L4: BMD: 0.901 g/cm2. T-score: -2.3. Z-score: -0.8. Degenerative change may artifactually increase BMD.  -RIGHT Hip Total: BMD: 0.765 g/cm2. T-score: -1.9. Z-score: -0.8.  -RIGHT Hip Femoral neck: BMD: 0.753 g/cm2. T-score: -2.0. Z-score: -0.6.  -LEFT Hip Total: BMD: 0.778 g/cm2. T-score: -1.8. Z-score: -0.7.  -LEFT Hip Femoral neck: BMD: 0.802 g/cm2. T-score: -1.7. Z-score: -0.3.      WHO T-SCORE CRITERIA  -Normal: T score at or above -1 SD  -Osteopenia: T score between -1 and -2.5 SD  -Osteoporosis: T score at or below -2.5 SD    The World Health Organization (WHO) criteria is applicable to perimenopausal females, postmenopausal females, and men aged 50 years or older.    TBS RESULTS  -Lumbar Spine TBS: L1-L4: TBS T-score: -2.7.TBS Z-score: -0.8.    The TBS is a DXA derived measurement for fracture risk assessment, and reflects the  structural condition of the bone microarchitecture. It can be used to adjust WHO Fracture Risk Assessment Tool (FRAX) probability of fracture in postmenopausal women and  older men. The calculated probabilities of fracture have been shown to be more accurate when computed with the TBS.    INTERVAL CHANGE  -There has been a 13.2% increase in lumbar spine BMD. This may be partially due to increased degenerative changes.  -There has been a 1.4% decrease in bilateral hip BMD.      FRACTURE RISK  -FRAX Results: The 10 year probability of major osteoporotic fracture is 31.9%, and of hip fracture is 2.9%, based on right femoral neck BMD.  -TBS-adjusted FRAX Results: The 10 year probability of major osteoporotic fracture is 32.2%, and of hip fracture is 3.3%.    RECOMMENDATIONS  Consider treatment if major osteoporotic fracture score is greater than or equal to 20%, and if the hip fracture score is greater than or equal to 3%.    Impression  IMPRESSION: Low bone density (OSTEOPENIA). T score meets the WHO criteria for low bone density (osteopenia) at one or more measured sites. The risk of osteoporotic fracture increases approximately two-fold for each standard deviation decrease in T-score.        Kymberly Francisco MD

## 2024-04-15 ENCOUNTER — LAB (OUTPATIENT)
Dept: LAB | Facility: CLINIC | Age: 64
End: 2024-04-15
Payer: MEDICARE

## 2024-04-15 DIAGNOSIS — M81.0 OSTEOPOROSIS WITHOUT CURRENT PATHOLOGICAL FRACTURE, UNSPECIFIED OSTEOPOROSIS TYPE: ICD-10-CM

## 2024-04-15 DIAGNOSIS — R94.6 ABNORMAL FINDING ON THYROID FUNCTION TEST: ICD-10-CM

## 2024-04-15 DIAGNOSIS — E55.9 VITAMIN D DEFICIENCY: ICD-10-CM

## 2024-04-15 PROCEDURE — 99000 SPECIMEN HANDLING OFFICE-LAB: CPT

## 2024-04-15 PROCEDURE — 84443 ASSAY THYROID STIM HORMONE: CPT

## 2024-04-15 PROCEDURE — 83970 ASSAY OF PARATHORMONE: CPT

## 2024-04-15 PROCEDURE — 82306 VITAMIN D 25 HYDROXY: CPT

## 2024-04-15 PROCEDURE — 82523 COLLAGEN CROSSLINKS: CPT | Mod: 90

## 2024-04-15 PROCEDURE — 36415 COLL VENOUS BLD VENIPUNCTURE: CPT

## 2024-04-15 PROCEDURE — 84075 ASSAY ALKALINE PHOSPHATASE: CPT

## 2024-04-15 PROCEDURE — 83735 ASSAY OF MAGNESIUM: CPT

## 2024-04-15 PROCEDURE — 82040 ASSAY OF SERUM ALBUMIN: CPT

## 2024-04-15 PROCEDURE — 82310 ASSAY OF CALCIUM: CPT

## 2024-04-16 LAB
ALBUMIN SERPL BCG-MCNC: 3.9 G/DL (ref 3.5–5.2)
ALP SERPL-CCNC: 31 U/L (ref 40–150)
CALCIUM SERPL-MCNC: 9.2 MG/DL (ref 8.8–10.2)
COLLAGEN CTX SERPL-MCNC: 63 PG/ML
MAGNESIUM SERPL-MCNC: 2.1 MG/DL (ref 1.7–2.3)
PTH-INTACT SERPL-MCNC: 25 PG/ML (ref 15–65)
TSH SERPL DL<=0.005 MIU/L-ACNC: 1.09 UIU/ML (ref 0.3–4.2)

## 2024-04-19 LAB
DEPRECATED CALCIDIOL+CALCIFEROL SERPL-MC: <53 UG/L (ref 20–75)
VITAMIN D2 SERPL-MCNC: <5 UG/L
VITAMIN D3 SERPL-MCNC: 48 UG/L

## 2024-05-22 ENCOUNTER — LAB REQUISITION (OUTPATIENT)
Dept: LAB | Facility: CLINIC | Age: 64
End: 2024-05-22
Payer: MEDICARE

## 2024-05-22 DIAGNOSIS — M25.50 PAIN IN UNSPECIFIED JOINT: ICD-10-CM

## 2024-05-22 DIAGNOSIS — E03.9 HYPOTHYROIDISM, UNSPECIFIED: ICD-10-CM

## 2024-05-22 LAB
CRP SERPL-MCNC: <3 MG/L
ERYTHROCYTE [DISTWIDTH] IN BLOOD BY AUTOMATED COUNT: 13.4 % (ref 10–15)
ERYTHROCYTE [SEDIMENTATION RATE] IN BLOOD BY WESTERGREN METHOD: 11 MM/HR (ref 0–30)
HCT VFR BLD AUTO: 43.8 % (ref 35–47)
HGB BLD-MCNC: 13.9 G/DL (ref 11.7–15.7)
MCH RBC QN AUTO: 29.4 PG (ref 26.5–33)
MCHC RBC AUTO-ENTMCNC: 31.7 G/DL (ref 31.5–36.5)
MCV RBC AUTO: 93 FL (ref 78–100)
PLATELET # BLD AUTO: 280 10E3/UL (ref 150–450)
RBC # BLD AUTO: 4.73 10E6/UL (ref 3.8–5.2)
RHEUMATOID FACT SERPL-ACNC: <10 IU/ML
TSH SERPL DL<=0.005 MIU/L-ACNC: 0.97 UIU/ML (ref 0.3–4.2)
WBC # BLD AUTO: 6.6 10E3/UL (ref 4–11)

## 2024-05-22 PROCEDURE — 86140 C-REACTIVE PROTEIN: CPT | Mod: ORL | Performed by: FAMILY MEDICINE

## 2024-05-22 PROCEDURE — 86431 RHEUMATOID FACTOR QUANT: CPT | Mod: ORL | Performed by: FAMILY MEDICINE

## 2024-05-22 PROCEDURE — 84443 ASSAY THYROID STIM HORMONE: CPT | Mod: ORL | Performed by: FAMILY MEDICINE

## 2024-05-22 PROCEDURE — 86618 LYME DISEASE ANTIBODY: CPT | Mod: ORL | Performed by: FAMILY MEDICINE

## 2024-05-22 PROCEDURE — 85027 COMPLETE CBC AUTOMATED: CPT | Mod: ORL | Performed by: FAMILY MEDICINE

## 2024-05-22 PROCEDURE — 86200 CCP ANTIBODY: CPT | Mod: ORL | Performed by: FAMILY MEDICINE

## 2024-05-22 PROCEDURE — 86038 ANTINUCLEAR ANTIBODIES: CPT | Mod: ORL | Performed by: FAMILY MEDICINE

## 2024-05-22 PROCEDURE — 85652 RBC SED RATE AUTOMATED: CPT | Mod: ORL | Performed by: FAMILY MEDICINE

## 2024-05-23 LAB
ANA PAT SER IF-IMP: ABNORMAL
ANA SER QL IF: POSITIVE
ANA TITR SER IF: ABNORMAL {TITER}
B BURGDOR IGG+IGM SER QL: 0.08
CCP AB SER IA-ACNC: 0.4 U/ML

## 2024-07-10 ENCOUNTER — TRANSFERRED RECORDS (OUTPATIENT)
Dept: HEALTH INFORMATION MANAGEMENT | Facility: CLINIC | Age: 64
End: 2024-07-10

## 2024-07-16 ENCOUNTER — TELEPHONE (OUTPATIENT)
Dept: ENDOCRINOLOGY | Facility: CLINIC | Age: 64
End: 2024-07-16
Payer: MEDICARE

## 2024-07-16 NOTE — TELEPHONE ENCOUNTER
Spoke w/ Pt. Confirms understanding to have Prolia at Infusion. Awaiting orders.   Provider notified.   Анна Burden RN on 7/16/2024 at 2:35 PM           [facility]    Phone Message    May a detailed message be left on voicemail: yes     Reason for Call: Other: Pt is set up for a prolia injection on 7/19/24, she is wondering if any lab work will be needed? Please advise.       Action Taken: Other: Endo     Travel Screening: Not Applicable     Date of Service: 7/16/24

## 2024-08-07 ENCOUNTER — TRANSFERRED RECORDS (OUTPATIENT)
Dept: HEALTH INFORMATION MANAGEMENT | Facility: CLINIC | Age: 64
End: 2024-08-07

## 2024-08-14 RX ORDER — MEPERIDINE HYDROCHLORIDE 25 MG/ML
25 INJECTION INTRAMUSCULAR; INTRAVENOUS; SUBCUTANEOUS EVERY 30 MIN PRN
OUTPATIENT
Start: 2025-02-10

## 2024-08-14 RX ORDER — METHYLPREDNISOLONE SODIUM SUCCINATE 125 MG/2ML
125 INJECTION, POWDER, LYOPHILIZED, FOR SOLUTION INTRAMUSCULAR; INTRAVENOUS
Start: 2025-02-10

## 2024-08-14 RX ORDER — ALBUTEROL SULFATE 0.83 MG/ML
2.5 SOLUTION RESPIRATORY (INHALATION)
OUTPATIENT
Start: 2025-02-10

## 2024-08-14 RX ORDER — ALBUTEROL SULFATE 90 UG/1
1-2 AEROSOL, METERED RESPIRATORY (INHALATION)
Start: 2025-02-10

## 2024-08-14 RX ORDER — DIPHENHYDRAMINE HYDROCHLORIDE 50 MG/ML
50 INJECTION INTRAMUSCULAR; INTRAVENOUS
Start: 2025-02-10

## 2024-08-14 RX ORDER — EPINEPHRINE 1 MG/ML
0.3 INJECTION, SOLUTION INTRAMUSCULAR; SUBCUTANEOUS EVERY 5 MIN PRN
OUTPATIENT
Start: 2025-02-10

## 2024-08-15 ENCOUNTER — INFUSION THERAPY VISIT (OUTPATIENT)
Dept: INFUSION THERAPY | Facility: CLINIC | Age: 64
End: 2024-08-15
Attending: INTERNAL MEDICINE
Payer: MEDICARE

## 2024-08-15 ENCOUNTER — APPOINTMENT (OUTPATIENT)
Dept: LAB | Facility: CLINIC | Age: 64
End: 2024-08-15
Payer: COMMERCIAL

## 2024-08-15 VITALS
TEMPERATURE: 98 F | RESPIRATION RATE: 16 BRPM | SYSTOLIC BLOOD PRESSURE: 137 MMHG | HEART RATE: 82 BPM | BODY MASS INDEX: 30.52 KG/M2 | HEIGHT: 64 IN | DIASTOLIC BLOOD PRESSURE: 88 MMHG | WEIGHT: 178.8 LBS | OXYGEN SATURATION: 100 %

## 2024-08-15 DIAGNOSIS — M81.0 OSTEOPOROSIS WITHOUT CURRENT PATHOLOGICAL FRACTURE, UNSPECIFIED OSTEOPOROSIS TYPE: Primary | ICD-10-CM

## 2024-08-15 DIAGNOSIS — Z92.29 PERSONAL HISTORY OF DRUG THERAPY: ICD-10-CM

## 2024-08-15 LAB
CALCIUM SERPL-MCNC: 9.4 MG/DL (ref 8.8–10.4)
CREAT SERPL-MCNC: 1.12 MG/DL (ref 0.51–0.95)
EGFRCR SERPLBLD CKD-EPI 2021: 54 ML/MIN/1.73M2

## 2024-08-15 PROCEDURE — 36415 COLL VENOUS BLD VENIPUNCTURE: CPT | Performed by: INTERNAL MEDICINE

## 2024-08-15 PROCEDURE — 82565 ASSAY OF CREATININE: CPT | Performed by: INTERNAL MEDICINE

## 2024-08-15 PROCEDURE — 96372 THER/PROPH/DIAG INJ SC/IM: CPT | Performed by: INTERNAL MEDICINE

## 2024-08-15 PROCEDURE — 250N000011 HC RX IP 250 OP 636: Performed by: INTERNAL MEDICINE

## 2024-08-15 PROCEDURE — 82310 ASSAY OF CALCIUM: CPT | Performed by: INTERNAL MEDICINE

## 2024-08-15 RX ORDER — CYCLOBENZAPRINE HCL 10 MG
10 TABLET ORAL 3 TIMES DAILY PRN
COMMUNITY

## 2024-08-15 RX ORDER — GALCANEZUMAB 120 MG/ML
INJECTION, SOLUTION SUBCUTANEOUS
COMMUNITY

## 2024-08-15 RX ADMIN — DENOSUMAB 60 MG: 60 INJECTION SUBCUTANEOUS at 12:24

## 2024-08-15 ASSESSMENT — PAIN SCALES - GENERAL: PAINLEVEL: NO PAIN (0)

## 2024-08-15 NOTE — PROGRESS NOTES
Nursing Note: Autumn Mason presents today for denosumab (prolia).   Patient seen by provider today: No   present during visit today: Not Applicable.     Note: Patient here for prolia and meets parameters.   May proceed with denosumab (Prolia) if labs are within normal limits. However, it is okay to give dose if creatinine is abnormal.      Treatment Conditions:  Lab Results   Component Value Date     01/11/2024    POTASSIUM 3.9 01/11/2024    MAG 2.1 04/15/2024    CR 1.12 (H) 08/15/2024    DAYNE 9.4 08/15/2024    BILITOTAL 0.2 10/26/2023    ALBUMIN 3.9 04/15/2024    ALT 22 10/26/2023    AST 19 10/26/2023     I released the medication and delegated administration to the supportive staff team member. Please see MAR and administration note for additional details.     Hayley Hernandez RN

## 2024-08-15 NOTE — PROGRESS NOTES
Patient presents to the [facility] for Prolia injection.  Order written by Dr. Francisco was completed today. Name and  verified with patient. See MAR for medication details. Medication was provided in one syringe by pharmacy and given in the following sites backside of upper left arm. Patient tolerated injection well and was discharged to home. Patient to return for next appt in six months.    Administrations This Visit       denosumab (PROLIA) injection 60 mg       Admin Date  08/15/2024 Action  $Given Dose  60 mg Route  Subcutaneous Documented By  Carrie Posada MA

## 2025-02-17 ENCOUNTER — ANCILLARY PROCEDURE (OUTPATIENT)
Dept: MAMMOGRAPHY | Facility: HOSPITAL | Age: 65
End: 2025-02-17
Attending: FAMILY MEDICINE
Payer: MEDICARE

## 2025-02-17 DIAGNOSIS — Z12.31 VISIT FOR SCREENING MAMMOGRAM: ICD-10-CM

## 2025-02-17 PROCEDURE — 77063 BREAST TOMOSYNTHESIS BI: CPT

## 2025-02-17 RX ORDER — ALBUTEROL SULFATE 90 UG/1
1-2 INHALANT RESPIRATORY (INHALATION)
Start: 2025-03-28

## 2025-02-17 RX ORDER — METHYLPREDNISOLONE SODIUM SUCCINATE 40 MG/ML
40 INJECTION INTRAMUSCULAR; INTRAVENOUS
Start: 2025-03-28

## 2025-02-17 RX ORDER — MEPERIDINE HYDROCHLORIDE 25 MG/ML
25 INJECTION INTRAMUSCULAR; INTRAVENOUS; SUBCUTANEOUS
OUTPATIENT
Start: 2025-03-28

## 2025-02-17 RX ORDER — ALBUTEROL SULFATE 0.83 MG/ML
2.5 SOLUTION RESPIRATORY (INHALATION)
OUTPATIENT
Start: 2025-03-28

## 2025-02-17 RX ORDER — DIPHENHYDRAMINE HYDROCHLORIDE 50 MG/ML
25 INJECTION INTRAMUSCULAR; INTRAVENOUS
Start: 2025-03-28

## 2025-02-17 RX ORDER — DIPHENHYDRAMINE HYDROCHLORIDE 50 MG/ML
50 INJECTION INTRAMUSCULAR; INTRAVENOUS
Start: 2025-03-28

## 2025-02-17 RX ORDER — EPINEPHRINE 1 MG/ML
0.3 INJECTION, SOLUTION INTRAMUSCULAR; SUBCUTANEOUS EVERY 5 MIN PRN
OUTPATIENT
Start: 2025-03-28

## 2025-02-18 ENCOUNTER — INFUSION THERAPY VISIT (OUTPATIENT)
Dept: INFUSION THERAPY | Facility: CLINIC | Age: 65
End: 2025-02-18
Attending: INTERNAL MEDICINE
Payer: MEDICARE

## 2025-02-18 VITALS
DIASTOLIC BLOOD PRESSURE: 86 MMHG | OXYGEN SATURATION: 100 % | TEMPERATURE: 98.3 F | SYSTOLIC BLOOD PRESSURE: 126 MMHG | HEART RATE: 89 BPM

## 2025-02-18 DIAGNOSIS — Z92.29 PERSONAL HISTORY OF DRUG THERAPY: ICD-10-CM

## 2025-02-18 DIAGNOSIS — M81.0 OSTEOPOROSIS WITHOUT CURRENT PATHOLOGICAL FRACTURE, UNSPECIFIED OSTEOPOROSIS TYPE: Primary | ICD-10-CM

## 2025-02-18 LAB
CALCIUM SERPL-MCNC: 9.8 MG/DL (ref 8.8–10.4)
CREAT SERPL-MCNC: 1.06 MG/DL (ref 0.51–0.95)
EGFRCR SERPLBLD CKD-EPI 2021: 58 ML/MIN/1.73M2

## 2025-02-18 PROCEDURE — 36415 COLL VENOUS BLD VENIPUNCTURE: CPT | Performed by: INTERNAL MEDICINE

## 2025-02-18 PROCEDURE — 96372 THER/PROPH/DIAG INJ SC/IM: CPT | Performed by: INTERNAL MEDICINE

## 2025-02-18 PROCEDURE — 82565 ASSAY OF CREATININE: CPT | Performed by: INTERNAL MEDICINE

## 2025-02-18 PROCEDURE — 82310 ASSAY OF CALCIUM: CPT | Performed by: INTERNAL MEDICINE

## 2025-02-18 PROCEDURE — 250N000011 HC RX IP 250 OP 636: Mod: JZ | Performed by: INTERNAL MEDICINE

## 2025-02-18 RX ADMIN — DENOSUMAB 60 MG: 60 INJECTION SUBCUTANEOUS at 12:07

## 2025-02-18 ASSESSMENT — PAIN SCALES - GENERAL: PAINLEVEL_OUTOF10: MODERATE PAIN (5)

## 2025-02-18 NOTE — PROGRESS NOTES
Patient presents to the [facility] for Prolia injection.  Order written by Dr. Francisco was completed today. Name and  verified with patient. See MAR for medication details. Patient continues to take Calcium and Vitamin D. Medication was divided into 1 syringe by pharmacy and given in the following site back side of left upper arm. Patient tolerated injection well and was discharged to home. Patient to return back in 6 months.      PATRICIO Marr LPN    Administrations This Visit       denosumab (PROLIA) injection 60 mg       Admin Date  2025 Action  $Given Dose  60 mg Route  Subcutaneous Documented By  Colton Marr LPN

## 2025-02-18 NOTE — PROGRESS NOTES
Nursing Note:    Autumn Mason presents today for   Chief Complaint   Patient presents with    Imm/Inj     Prolia (denosumab)      Treatment Conditions:  Lab Results   Component Value Date     01/11/2024    POTASSIUM 3.9 01/11/2024    MAG 2.1 04/15/2024    CR 1.06 (H) 02/18/2025    DAYNE 9.8 02/18/2025    BILITOTAL 0.2 10/26/2023    ALBUMIN 3.9 04/15/2024    ALT 22 10/26/2023    AST 19 10/26/2023     I released the medication and delegated administration to the supportive staff team member. Please see MAR and administration note for additional details.     Hayley Hernandez RN

## 2025-03-05 ENCOUNTER — PATIENT OUTREACH (OUTPATIENT)
Dept: CARE COORDINATION | Facility: CLINIC | Age: 65
End: 2025-03-05
Payer: MEDICARE

## 2025-03-21 ENCOUNTER — HOSPITAL ENCOUNTER (OUTPATIENT)
Dept: ULTRASOUND IMAGING | Facility: HOSPITAL | Age: 65
Discharge: HOME | End: 2025-03-21
Attending: STUDENT IN AN ORGANIZED HEALTH CARE EDUCATION/TRAINING PROGRAM | Admitting: STUDENT IN AN ORGANIZED HEALTH CARE EDUCATION/TRAINING PROGRAM
Payer: MEDICARE

## 2025-03-21 DIAGNOSIS — Z82.79 FAMILY HISTORY OF OTHER CONGENITAL MALFORMATIONS, DEFORMATIONS AND CHROMOSOMAL ABNORMALITIES: ICD-10-CM

## 2025-03-21 PROCEDURE — 76536 US EXAM OF HEAD AND NECK: CPT

## 2025-03-31 NOTE — PROGRESS NOTES
"03/31/25 1:01 PM  PATIENT LAB/IMAGING STATUS : Orders completed / No further action needed       03/29/24 10:29 AM  PATIENT LAB/IMAGING STATUS : No pending lab orders         [facility]ology [facility]         Follow up for Osteoporosis      Assessment:  64 postmenopausal woman with diagnosis of osteoporosis and hypothyroidism    Osteoporosis  64 y old postmenopausal woman with diagnosis of osteoporosis now on Prolia treatment since 5/2022.  Tolerates these well.  DXA from 2023 showed lowest bone density of -2.3 at the lumbar spine.  Patient is considered no longer at high fracture risk considering no fracture for last 6 years, bone density better than -2.5.  Therefore opted to discontinue Prolia injections, which would be considered the shorter duration being less than 3 years of treatment which is associated with less rebound fracture risk.  Will follow-up with Reclast infusion about 6.5 months after the last Prolia injection.    Patient previously had myalgias on alendronate, however as this has been many years ago it is reasonable to rechallenge with Reclast.  Reviewed to contact me if she should perceive a lot of myalgias and we would provide prednisone.  Will see patient after the Reclast infusion in September.  Will plan then to do CTX 3 months after Reclast infusion, if low repeat CTX 6 months after Reclast infusion.  If CTX should be increased would repeat Reclast infusion at that time for total of 1 or 2 Reclast infusion as indicated.    Hypothyroidism  Follo followed by PCP    Plan:   Stop Prolia injections  Switch to Reclast infusion in late August 2025   DEXA scan in August or September 2025   Follow-up with me in September 2025   Will then discuss further details about Prolia discontinuation follow-up including checking of bone \"  Continue levothyroxine 112 mcg  Reviewed the need to hold biotin for 5 days prior to checking most labs and particularly thyroid labs    Follow-up with me in September " 2025      The Longitudinal plan of care for hypothyroidism and osteoporosis was addressed during this visit. Due to added complexity of care, we will continue to support Autumn , and the subsequent management of this condition(s) and with the ongoing continuity of care of this condition.     Kymberly Francisco MD  Endocrinology and Diabetes  Telephone contact:  [facility] [place] [phone]  [facility] [phone]      Interval history  - developed back pain summer 2023, received injections and had microdiscectomy in 1/24;   - had prolia in 7/2023 with PCP, has now received Prolia for 2.5 years.  Excellent improvement in bone density at the spine.     - had dental procedure with a crown in 2023, otherwise no problems    - weight stable, abdominal dyscomfort no    - since last visit falls no, fractures no     - Patient notes her mother was diagnosed with osteoporosis per bone density, mother also receives Prolia injections now.  And her maternal grandmother had osteoporosis with large curvature    - patient has been busy with work, and taking care of     Most recen DXA scan 3/29/23 T-score -2.3 lumbar spine change +13% improvement    Osteoporosis medication since last visit: Prolia injection: 2/2025 7/2024 1/2024, 7/2023, 1/2023,  7/22; gets these not in the infusion center, however would [place] [facility]    Calcium: supplement twice daily  Vitamin D: With calcium  Physical activity: Somewhat limited, patient is quite busy taking care of her  who is struggling with Alzheimer's disease    HPI  Boniva for about 5 years, which was stopped in fall 2021 about 1 year ago. The effect of Boniva treatment on bone density was felt to be insuffient.  Patient briefly was treated with calcitonin nasal spray.  Patient was switched to Prolia.  Received first injection this May or June 2022.      Past Medical and Past Surgical  History:  Past Medical History:   Diagnosis Date    GERD (gastroesophageal reflux disease)     Hepatitis C, chronic (H)     Nosebleeds     Obstructive sleep apnea     Thyroid disease     hypothyroidism    Ulcerative colitis        Past Surgical History:   Procedure Laterality Date    APPENDECTOMY      ENT SURGERY      sinus surgery    GYN SURGERY      Bilateral Salpingectomy and Oopherectomy       Medications:   Current Outpatient Medications   Medication Sig Dispense Refill    Biotin (SUPER BIOTIN) 5 MG TABS Take 1 tablet by mouth daily      Calcium Carbonate-Vit D-Min (CALCIUM 600+D PLUS MINERALS) 600-400 MG-UNIT TABS Take 1 tablet by mouth 2 times daily      Cholecalciferol (VITAMIN D) 1000 UNITS capsule Take 1 capsule by mouth daily.      cyclobenzaprine (FLEXERIL) 10 MG tablet Take 10 mg by mouth 3 times daily as needed for muscle spasms      denosumab (PROLIA) 60 MG/ML SOSY injection Inject 60 mg Subcutaneous every 6 months      DULoxetine (CYMBALTA) 30 MG capsule Take 30 mg by mouth daily 2 in the morning and 1 at night      gabapentin (NEURONTIN) 300 MG capsule Take 300 mg by mouth 2 times daily as needed (Pain) (Patient not taking: Reported on 8/15/2024)      galcanezumab-gnlm (EMGALITY) 120 MG/ML injection       levothyroxine (SYNTHROID/LEVOTHROID) 112 MCG tablet Take 1 tablet by mouth daily      mesalamine (LIALDA) 1.2 g DR tablet Take 1,200 mg by mouth 2 times daily      milnacipran (SAVELLA) 100 MG TABS tablet Take 100 mg by mouth 2 times daily      omeprazole (PRILOSEC) 40 MG capsule Take 40 mg by mouth 2 times daily      sucralfate (CARAFATE) 1 GM tablet Take 1 g by mouth 4 times daily 1-4 times per day      tiZANidine (ZANAFLEX) 4 MG tablet Take 4 mg by mouth 2 times daily (Patient not taking: Reported on 8/15/2024)      topiramate (TOPAMAX) 200 MG tablet Take 100 mg by mouth every morning And 200 mg evening         Allergies:   Allergies   Allergen Reactions    Fosamax Other (See Comments)     Body  aches      Zonisamide Other (See Comments)     depression    Other Reaction(s): depression    Bacitracin Rash    Erythromycin Rash and Hives    Neomycin-Polymyxin B Gu Rash    Sulfa Antibiotics Rash       Social History     Tobacco Use    Smoking status: Never    Smokeless tobacco: Never   Substance Use Topics    Alcohol use: No       Physical Examination:  There were no vitals taken for this visit.    Wt Readings from Last 4 Encounters:  09/05/13 : 79.8 kg (176 lb)  08/22/13 : 72.6 kg (160 lb)  03/14/12 : 62.1 kg (137 lb)    General: Well appearing female in no distress, up and go quick  Eyes: EOMI. Sclerae and conjunctivae are clear.     Labs and Studies:   Lab Results   Component Value Date    DAYNE 9.8 02/18/2025    DAYNE 9.4 08/15/2024    DAYNE 9.2 04/15/2024    DAYNE 9.4 01/11/2024    ALBUMIN 3.9 04/15/2024    ALT 22 10/26/2023    PTHI 25 04/15/2024    PTHI 32 05/09/2022    AST 19 10/26/2023    BILITOTAL 0.2 10/26/2023    CR 1.06 (H) 02/18/2025    CR 1.12 (H) 08/15/2024    CR 1.02 (H) 01/11/2024     01/11/2024    TSH 0.97 05/22/2024    ALKPHOS 31 (L) 04/15/2024    HGB 13.9 05/22/2024 5/9/22 25OHVitD 47    Results for orders placed during the hospital encounter of 03/29/23    Dexa hip/pelvis/spine    Narrative  EXAM: DX HIP/PELVIS/SPINE  LOCATION: [facility]  DATE/TIME: 3/29/2023 1:41 PM    INDICATION:  Osteoporosis without current pathological fracture, unspecified osteoporosis type. Postmenopausal.  DEMOGRAPHICS: Age- 62 years. Gender- Female. Menopausal status- Postmenopausal.  COMPARISON: 10/14/2021  TECHNIQUE: Dual-energy x-ray absorptiometry (DXA) performed with routine technique. Trabecular bone score (TBS) analysis performed.    FINDINGS:    DXA RESULTS  -Lumbar Spine: L1-L4: BMD: 0.901 g/cm2. T-score: -2.3. Z-score: -0.8. Degenerative change may artifactually increase BMD.  -RIGHT Hip Total: BMD: 0.765 g/cm2. T-score: -1.9. Z-score: -0.8.  -RIGHT Hip Femoral neck: BMD: 0.753  g/cm2. T-score: -2.0. Z-score: -0.6.  -LEFT Hip Total: BMD: 0.778 g/cm2. T-score: -1.8. Z-score: -0.7.  -LEFT Hip Femoral neck: BMD: 0.802 g/cm2. T-score: -1.7. Z-score: -0.3.      WHO T-SCORE CRITERIA  -Normal: T score at or above -1 SD  -Osteopenia: T score between -1 and -2.5 SD  -Osteoporosis: T score at or below -2.5 SD    The World Health Organization (WHO) criteria is applicable to perimenopausal females, postmenopausal females, and men aged 50 years or older.    TBS RESULTS  -Lumbar Spine TBS: L1-L4: TBS T-score: -2.7.TBS Z-score: -0.8.    The TBS is a DXA derived measurement for fracture risk assessment, and reflects the structural condition of the bone microarchitecture. It can be used to adjust WHO Fracture Risk Assessment Tool (FRAX) probability of fracture in postmenopausal women and  older men. The calculated probabilities of fracture have been shown to be more accurate when computed with the TBS.    INTERVAL CHANGE  -There has been a 13.2% increase in lumbar spine BMD. This may be partially due to increased degenerative changes.  -There has been a 1.4% decrease in bilateral hip BMD.      FRACTURE RISK  -FRAX Results: The 10 year probability of major osteoporotic fracture is 31.9%, and of hip fracture is 2.9%, based on right femoral neck BMD.  -TBS-adjusted FRAX Results: The 10 year probability of major osteoporotic fracture is 32.2%, and of hip fracture is 3.3%.    RECOMMENDATIONS  Consider treatment if major osteoporotic fracture score is greater than or equal to 20%, and if the hip fracture score is greater than or equal to 3%.    Impression  IMPRESSION: Low bone density (OSTEOPENIA). T score meets the WHO criteria for low bone density (osteopenia) at one or more measured sites. The risk of osteoporotic fracture increases approximately two-fold for each standard deviation decrease in T-score.

## 2025-04-08 ENCOUNTER — OFFICE VISIT (OUTPATIENT)
Dept: ENDOCRINOLOGY | Facility: CLINIC | Age: 65
End: 2025-04-08
Payer: COMMERCIAL

## 2025-04-08 VITALS
SYSTOLIC BLOOD PRESSURE: 150 MMHG | DIASTOLIC BLOOD PRESSURE: 91 MMHG | OXYGEN SATURATION: 100 % | HEIGHT: 65 IN | HEART RATE: 91 BPM | WEIGHT: 168 LBS | BODY MASS INDEX: 27.99 KG/M2

## 2025-04-08 DIAGNOSIS — M81.0 OSTEOPOROSIS WITHOUT CURRENT PATHOLOGICAL FRACTURE, UNSPECIFIED OSTEOPOROSIS TYPE: Primary | ICD-10-CM

## 2025-04-08 DIAGNOSIS — Z92.29 PERSONAL HISTORY OF DRUG THERAPY: ICD-10-CM

## 2025-04-08 PROCEDURE — G2211 COMPLEX E/M VISIT ADD ON: HCPCS | Performed by: INTERNAL MEDICINE

## 2025-04-08 PROCEDURE — 1126F AMNT PAIN NOTED NONE PRSNT: CPT | Performed by: INTERNAL MEDICINE

## 2025-04-08 PROCEDURE — 3080F DIAST BP >= 90 MM HG: CPT | Performed by: INTERNAL MEDICINE

## 2025-04-08 PROCEDURE — 99214 OFFICE O/P EST MOD 30 MIN: CPT | Performed by: INTERNAL MEDICINE

## 2025-04-08 PROCEDURE — 3077F SYST BP >= 140 MM HG: CPT | Performed by: INTERNAL MEDICINE

## 2025-04-08 RX ORDER — METHYLPREDNISOLONE SODIUM SUCCINATE 40 MG/ML
40 INJECTION INTRAMUSCULAR; INTRAVENOUS
Start: 2025-08-25

## 2025-04-08 RX ORDER — ALBUTEROL SULFATE 90 UG/1
1-2 INHALANT RESPIRATORY (INHALATION)
Start: 2025-08-25

## 2025-04-08 RX ORDER — ZOLEDRONIC ACID 0.05 MG/ML
5 INJECTION, SOLUTION INTRAVENOUS ONCE
Start: 2025-08-25

## 2025-04-08 RX ORDER — DIPHENHYDRAMINE HYDROCHLORIDE 50 MG/ML
25 INJECTION, SOLUTION INTRAMUSCULAR; INTRAVENOUS
Start: 2025-08-25

## 2025-04-08 RX ORDER — ACETAMINOPHEN 325 MG/1
650 TABLET ORAL
OUTPATIENT
Start: 2025-08-25

## 2025-04-08 RX ORDER — DIPHENHYDRAMINE HYDROCHLORIDE 50 MG/ML
50 INJECTION, SOLUTION INTRAMUSCULAR; INTRAVENOUS
Start: 2025-08-25

## 2025-04-08 RX ORDER — HEPARIN SODIUM,PORCINE 10 UNIT/ML
5-20 VIAL (ML) INTRAVENOUS DAILY PRN
OUTPATIENT
Start: 2025-08-25

## 2025-04-08 RX ORDER — EPINEPHRINE 1 MG/ML
0.3 INJECTION, SOLUTION, CONCENTRATE INTRAVENOUS EVERY 5 MIN PRN
OUTPATIENT
Start: 2025-08-25

## 2025-04-08 RX ORDER — ALBUTEROL SULFATE 0.83 MG/ML
2.5 SOLUTION RESPIRATORY (INHALATION)
OUTPATIENT
Start: 2025-08-25

## 2025-04-08 RX ORDER — HEPARIN SODIUM (PORCINE) LOCK FLUSH IV SOLN 100 UNIT/ML 100 UNIT/ML
5 SOLUTION INTRAVENOUS
OUTPATIENT
Start: 2025-08-25

## 2025-04-08 ASSESSMENT — PAIN SCALES - GENERAL: PAINLEVEL_OUTOF10: NO PAIN (0)

## 2025-04-08 NOTE — LETTER
4/8/2025       RE: Autumn Mason  [address] [address]     Dear Colleague,    Thank you for referring your patient, Autumn Mason, to the [facility] [place] [facility]. Please see a copy of my visit note below.    03/31/25 1:01 PM  PATIENT LAB/IMAGING STATUS : Orders completed / No further action needed       03/29/24 10:29 AM  PATIENT LAB/IMAGING STATUS : No pending lab orders         Endocrinology and Diabetes Clinic         Follow up for Osteoporosis      Assessment:  64 postmenopausal woman with diagnosis of osteoporosis and hypothyroidism    Osteoporosis  64 y old postmenopausal woman with diagnosis of osteoporosis now on Prolia treatment since 5/2022.  Tolerates these well.  DXA from 2023 showed lowest bone density of -2.3 at the lumbar spine.  Patient is considered no longer at high fracture risk considering no fracture for last 6 years, bone density better than -2.5.  Therefore opted to discontinue Prolia injections, which would be considered the shorter duration being less than 3 years of treatment which is associated with less rebound fracture risk.  Will follow-up with Reclast infusion about 6.5 months after the last Prolia injection.    Patient previously had myalgias on alendronate, however as this has been many years ago it is reasonable to rechallenge with Reclast.  Reviewed to contact me if she should perceive a lot of myalgias and we would provide prednisone.  Will see patient after the Reclast infusion in September.  Will plan then to do CTX 3 months after Reclast infusion, if low repeat CTX 6 months after Reclast infusion.  If CTX should be increased would repeat Reclast infusion at that time for total of 1 or 2 Reclast infusion as indicated.    Hypothyroidism  Follo followed by PCP    Plan:   Stop Prolia injections  Switch to Reclast infusion in late August 2025   DEXA scan in August or  "September 2025   Follow-up with me in September 2025   Will then discuss further details about Prolia discontinuation follow-up including checking of bone \"  Continue levothyroxine 112 mcg  Reviewed the need to hold biotin for 5 days prior to checking most labs and particularly thyroid labs    Follow-up with me in September 2025      The Longitudinal plan of care for hypothyroidism and osteoporosis was addressed during this visit. Due to added complexity of care, we will continue to support Autumn , and the subsequent management of this condition(s) and with the ongoing continuity of care of this condition.     Kymberly Francisco MD  Endocrinology and Diabetes  Telephone contact:  [facility] [place] [phone]  [facility] [phone]      Interval history  - developed back pain summer 2023, received injections and had microdiscectomy in 1/24;   - had prolia in 7/2023 with PCP, has now received Prolia for 2.5 years.  Excellent improvement in bone density at the spine.     - had dental procedure with a crown in 2023, otherwise no problems    - weight stable, abdominal dyscomfort no    - since last visit falls no, fractures no     - Patient notes her mother was diagnosed with osteoporosis per bone density, mother also receives Prolia injections now.  And her maternal grandmother had osteoporosis with large curvature    - patient has been busy with work, and taking care of     Most recen DXA scan 3/29/23 T-score -2.3 lumbar spine change +13% improvement    Osteoporosis medication since last visit: Prolia injection: 2/2025 7/2024 1/2024, 7/2023, 1/2023,  7/22; gets these not in the infusion center, however would like to switch to the infusion center at the [facility] [place]    Calcium: supplement twice daily  Vitamin D: With calcium  Physical activity: Somewhat limited, patient is quite busy taking care of her  who is struggling with Alzheimer's disease    HPI  Boniva for " about 5 years, which was stopped in fall 2021 about 1 year ago. The effect of Boniva treatment on bone density was felt to be insuffient.  Patient briefly was treated with calcitonin nasal spray.  Patient was switched to Prolia.  Received first injection this May or June 2022.      Past Medical and Past Surgical History:  Past Medical History:   Diagnosis Date     GERD (gastroesophageal reflux disease)      Hepatitis C, chronic (H)      Nosebleeds      Obstructive sleep apnea      Thyroid disease     hypothyroidism     Ulcerative colitis        Past Surgical History:   Procedure Laterality Date     APPENDECTOMY       ENT SURGERY      sinus surgery     GYN SURGERY      Bilateral Salpingectomy and Oopherectomy       Medications:   Current Outpatient Medications   Medication Sig Dispense Refill     Biotin (SUPER BIOTIN) 5 MG TABS Take 1 tablet by mouth daily       Calcium Carbonate-Vit D-Min (CALCIUM 600+D PLUS MINERALS) 600-400 MG-UNIT TABS Take 1 tablet by mouth 2 times daily       Cholecalciferol (VITAMIN D) 1000 UNITS capsule Take 1 capsule by mouth daily.       cyclobenzaprine (FLEXERIL) 10 MG tablet Take 10 mg by mouth 3 times daily as needed for muscle spasms       denosumab (PROLIA) 60 MG/ML SOSY injection Inject 60 mg Subcutaneous every 6 months       DULoxetine (CYMBALTA) 30 MG capsule Take 30 mg by mouth daily 2 in the morning and 1 at night       gabapentin (NEURONTIN) 300 MG capsule Take 300 mg by mouth 2 times daily as needed (Pain) (Patient not taking: Reported on 8/15/2024)       galcanezumab-gnlm (EMGALITY) 120 MG/ML injection        levothyroxine (SYNTHROID/LEVOTHROID) 112 MCG tablet Take 1 tablet by mouth daily       mesalamine (LIALDA) 1.2 g DR tablet Take 1,200 mg by mouth 2 times daily       milnacipran (SAVELLA) 100 MG TABS tablet Take 100 mg by mouth 2 times daily       omeprazole (PRILOSEC) 40 MG capsule Take 40 mg by mouth 2 times daily       sucralfate (CARAFATE) 1 GM tablet Take 1 g by mouth  4 times daily 1-4 times per day       tiZANidine (ZANAFLEX) 4 MG tablet Take 4 mg by mouth 2 times daily (Patient not taking: Reported on 8/15/2024)       topiramate (TOPAMAX) 200 MG tablet Take 100 mg by mouth every morning And 200 mg evening         Allergies:   Allergies   Allergen Reactions     Fosamax Other (See Comments)     Body aches       Zonisamide Other (See Comments)     depression    Other Reaction(s): depression     Bacitracin Rash     Erythromycin Rash and Hives     Neomycin-Polymyxin B Gu Rash     Sulfa Antibiotics Rash       Social History     Tobacco Use     Smoking status: Never     Smokeless tobacco: Never   Substance Use Topics     Alcohol use: No       Physical Examination:  There were no vitals taken for this visit.    Wt Readings from Last 4 Encounters:  09/05/13 : 79.8 kg (176 lb)  08/22/13 : 72.6 kg (160 lb)  03/14/12 : 62.1 kg (137 lb)    General: Well appearing female in no distress, up and go quick  Eyes: EOMI. Sclerae and conjunctivae are clear.     Labs and Studies:   Lab Results   Component Value Date    DAYNE 9.8 02/18/2025    DAYNE 9.4 08/15/2024    DAYNE 9.2 04/15/2024    ADYNE 9.4 01/11/2024    ALBUMIN 3.9 04/15/2024    ALT 22 10/26/2023    PTHI 25 04/15/2024    PTHI 32 05/09/2022    AST 19 10/26/2023    BILITOTAL 0.2 10/26/2023    CR 1.06 (H) 02/18/2025    CR 1.12 (H) 08/15/2024    CR 1.02 (H) 01/11/2024     01/11/2024    TSH 0.97 05/22/2024    ALKPHOS 31 (L) 04/15/2024    HGB 13.9 05/22/2024 5/9/22 25OHVitD 47    Results for orders placed during the hospital encounter of 03/29/23    Dexa hip/pelvis/spine    Narrative  EXAM: DX HIP/PELVIS/SPINE  LOCATION: [facility]  DATE/TIME: 3/29/2023 1:41 PM    INDICATION:  Osteoporosis without current pathological fracture, unspecified osteoporosis type. Postmenopausal.  DEMOGRAPHICS: Age- 62 years. Gender- Female. Menopausal status- Postmenopausal.  COMPARISON: 10/14/2021  TECHNIQUE: Dual-energy x-ray  absorptiometry (DXA) performed with routine technique. Trabecular bone score (TBS) analysis performed.    FINDINGS:    DXA RESULTS  -Lumbar Spine: L1-L4: BMD: 0.901 g/cm2. T-score: -2.3. Z-score: -0.8. Degenerative change may artifactually increase BMD.  -RIGHT Hip Total: BMD: 0.765 g/cm2. T-score: -1.9. Z-score: -0.8.  -RIGHT Hip Femoral neck: BMD: 0.753 g/cm2. T-score: -2.0. Z-score: -0.6.  -LEFT Hip Total: BMD: 0.778 g/cm2. T-score: -1.8. Z-score: -0.7.  -LEFT Hip Femoral neck: BMD: 0.802 g/cm2. T-score: -1.7. Z-score: -0.3.      WHO T-SCORE CRITERIA  -Normal: T score at or above -1 SD  -Osteopenia: T score between -1 and -2.5 SD  -Osteoporosis: T score at or below -2.5 SD    The World Health Organization (WHO) criteria is applicable to perimenopausal females, postmenopausal females, and men aged 50 years or older.    TBS RESULTS  -Lumbar Spine TBS: L1-L4: TBS T-score: -2.7.TBS Z-score: -0.8.    The TBS is a DXA derived measurement for fracture risk assessment, and reflects the structural condition of the bone microarchitecture. It can be used to adjust WHO Fracture Risk Assessment Tool (FRAX) probability of fracture in postmenopausal women and  older men. The calculated probabilities of fracture have been shown to be more accurate when computed with the TBS.    INTERVAL CHANGE  -There has been a 13.2% increase in lumbar spine BMD. This may be partially due to increased degenerative changes.  -There has been a 1.4% decrease in bilateral hip BMD.      FRACTURE RISK  -FRAX Results: The 10 year probability of major osteoporotic fracture is 31.9%, and of hip fracture is 2.9%, based on right femoral neck BMD.  -TBS-adjusted FRAX Results: The 10 year probability of major osteoporotic fracture is 32.2%, and of hip fracture is 3.3%.    RECOMMENDATIONS  Consider treatment if major osteoporotic fracture score is greater than or equal to 20%, and if the hip fracture score is greater than or equal to  3%.    Impression  IMPRESSION: Low bone density (OSTEOPENIA). T score meets the WHO criteria for low bone density (osteopenia) at one or more measured sites. The risk of osteoporotic fracture increases approximately two-fold for each standard deviation decrease in T-score.          Again, thank you for allowing me to participate in the care of your patient.      Sincerely,    Kymberly Francisco MD

## 2025-04-08 NOTE — NURSING NOTE
"Chief Complaint   Patient presents with    Osteoporosis     Vital signs:      BP: (!) 150/91 Pulse: 91     SpO2: 100 %     Height: 164.5 cm (5' 4.75\") Weight: 76.2 kg (168 lb)  Estimated body mass index is 28.17 kg/m  as calculated from the following:    Height as of this encounter: 1.645 m (5' 4.75\").    Weight as of this encounter: 76.2 kg (168 lb).        "

## 2025-04-08 NOTE — PATIENT INSTRUCTIONS
Switch to Reclast infusion in end of August    For scheduling osteoporosis treatment at the infusion center:      Please call a [facility] at the   - [facility]  [phone] (will connect to [facility] [place])  - [facility]        Bone density in 8/2025    Hold BIOTIN for 5 days prior to most blood work including for thyroid

## 2025-04-17 ENCOUNTER — LAB REQUISITION (OUTPATIENT)
Dept: LAB | Facility: CLINIC | Age: 65
End: 2025-04-17
Payer: MEDICARE

## 2025-04-17 DIAGNOSIS — E78.5 HYPERLIPIDEMIA, UNSPECIFIED: ICD-10-CM

## 2025-04-17 DIAGNOSIS — E03.9 HYPOTHYROIDISM, UNSPECIFIED: ICD-10-CM

## 2025-04-17 LAB
ALBUMIN SERPL BCG-MCNC: 4.2 G/DL (ref 3.5–5.2)
ALP SERPL-CCNC: 28 U/L (ref 40–150)
ALT SERPL W P-5'-P-CCNC: 14 U/L (ref 0–50)
ANION GAP SERPL CALCULATED.3IONS-SCNC: 11 MMOL/L (ref 7–15)
AST SERPL W P-5'-P-CCNC: 16 U/L (ref 0–45)
BILIRUB SERPL-MCNC: 0.2 MG/DL
BUN SERPL-MCNC: 9.8 MG/DL (ref 8–23)
CALCIUM SERPL-MCNC: 8.8 MG/DL (ref 8.8–10.4)
CHLORIDE SERPL-SCNC: 106 MMOL/L (ref 98–107)
CHOLEST SERPL-MCNC: 213 MG/DL
CREAT SERPL-MCNC: 1.04 MG/DL (ref 0.51–0.95)
EGFRCR SERPLBLD CKD-EPI 2021: 59 ML/MIN/1.73M2
FASTING STATUS PATIENT QL REPORTED: YES
FASTING STATUS PATIENT QL REPORTED: YES
GLUCOSE SERPL-MCNC: 101 MG/DL (ref 70–99)
HCO3 SERPL-SCNC: 22 MMOL/L (ref 22–29)
HDLC SERPL-MCNC: 69 MG/DL
LDLC SERPL CALC-MCNC: 130 MG/DL
NONHDLC SERPL-MCNC: 144 MG/DL
POTASSIUM SERPL-SCNC: 3.9 MMOL/L (ref 3.4–5.3)
PROT SERPL-MCNC: 6.7 G/DL (ref 6.4–8.3)
SODIUM SERPL-SCNC: 139 MMOL/L (ref 135–145)
TRIGL SERPL-MCNC: 72 MG/DL
TSH SERPL DL<=0.005 MIU/L-ACNC: 0.99 UIU/ML (ref 0.3–4.2)
VIT B12 SERPL-MCNC: 3505 PG/ML (ref 232–1245)

## 2025-04-17 PROCEDURE — 82607 VITAMIN B-12: CPT | Mod: ORL | Performed by: FAMILY MEDICINE

## 2025-04-17 PROCEDURE — 80053 COMPREHEN METABOLIC PANEL: CPT | Mod: ORL | Performed by: FAMILY MEDICINE

## 2025-04-17 PROCEDURE — 80061 LIPID PANEL: CPT | Mod: ORL | Performed by: FAMILY MEDICINE

## 2025-04-17 PROCEDURE — 84443 ASSAY THYROID STIM HORMONE: CPT | Mod: ORL | Performed by: FAMILY MEDICINE

## 2025-06-16 ENCOUNTER — VIRTUAL VISIT (OUTPATIENT)
Dept: PSYCHOLOGY | Facility: CLINIC | Age: 65
End: 2025-06-16
Payer: MEDICARE

## 2025-06-16 DIAGNOSIS — F41.1 GENERALIZED ANXIETY DISORDER: Primary | ICD-10-CM

## 2025-06-16 PROCEDURE — 90791 PSYCH DIAGNOSTIC EVALUATION: CPT | Mod: 95 | Performed by: PSYCHOLOGIST
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
## 2025-06-17 ENCOUNTER — VIRTUAL VISIT (OUTPATIENT)
Dept: PSYCHOLOGY | Facility: CLINIC | Age: 65
End: 2025-06-17
Payer: MEDICARE

## 2025-06-17 DIAGNOSIS — F41.1 GENERALIZED ANXIETY DISORDER: Primary | ICD-10-CM

## 2025-06-17 PROCEDURE — 90837 PSYTX W PT 60 MINUTES: CPT | Mod: 95 | Performed by: PSYCHOLOGIST

## 2025-06-17 NOTE — PROGRESS NOTES
[facility]                                     Progress Note    Patient Name: Autumn Mason  Date: 6/17/2025         Service Type: Individual      Session Start Time: 02:03  Session End Time: 03:03     Session Length: 60 minutes    Session #: 3    Attendees: Client    Service Modality:  In-person    DATA Extended Session (53+ minutes): PROLONGED SERVICE IN THE OUTPATIENT SETTING REQUIRING DIRECT (FACE-TO-FACE) PATIENT CONTACT BEYOND THE USUAL SERVICE:    - Patient's presenting concerns require more intensive intervention than could be completed within the usual service    - High distress and under complex circumstances.  See Data section for details  Interactive Complexity: No  Crisis: No           Progress Since Last Session (Related to Symptoms / Goals / Homework):   Symptoms: No change      Homework: Completed in session      Episode of Care Goals: No improvement - PREPARATION (Decided to change - considering how); Intervened by negotiating a change plan and determining options / strategies for behavior change, identifying triggers, exploring social supports, and working towards setting a date to begin behavior change     Current / Ongoing Stressors and Concerns:   During today's session, the patient and provider worked on establishing their therapeutic treatment plan. Patient and provider discussed behavior change principles and identified barriers and opportunities to implement these into their daily life. Treatment areas will focus on improving setting and maintaining boundaries, increase mindfulness and emotion regulation skills.     Treatment Objective(s) Addressed in This Session:     Establish therapeutic treatment plan     Intervention:   Motivational Interviewing: Co-developed goals, express understanding, and simple reflections    Assessments completed prior to visit:  The following assessments were completed by patient for this visit:  PHQ9:       6/2/2025     2:21 PM   PHQ-9 SCORE    PHQ-9 Total Score MyChart 4 (Minimal depression)   PHQ-9 Total Score 4        Patient-reported     GAD7:       6/3/2025     8:51 AM   BRIEN-7 SCORE   Total Score 5 (mild anxiety)   Total Score 5        Patient-reported     CAGE-AID:       5/31/2025     2:35 AM   CAGE-AID Total Score   Total Score 0    Total Score MyChart 0 (A total score of 2 or greater is considered clinically significant)       Patient-reported     PROMIS 10-Global Health (all questions and answers displayed):       5/31/2025     2:35 AM   PROMIS 10   In general, would you say your health is: Good   In general, would you say your quality of life is: Good   In general, how would you rate your physical health? Good   In general, how would you rate your mental health, including your mood and your ability to think? Good   In general, how would you rate your satisfaction with your social activities and relationships? Good   In general, please rate how well you carry out your usual social activities and roles Good   To what extent are you able to carry out your everyday physical activities such as walking, climbing stairs, carrying groceries, or moving a chair? Mostly   In the past 7 days, how often have you been bothered by emotional problems such as feeling anxious, depressed, or irritable? Sometimes   In the past 7 days, how would you rate your fatigue on average? Severe   In the past 7 days, how would you rate your pain on average, where 0 means no pain, and 10 means worst imaginable pain? 7   In general, would you say your health is: 3   In general, would you say your quality of life is: 3   In general, how would you rate your physical health? 3   In general, how would you rate your mental health, including your mood and your ability to think? 3   In general, how would you rate your satisfaction with your social activities and relationships? 3   In general, please rate how well you carry out your usual social activities and roles. (This includes  activities at home, at work and in your community, and responsibilities as a parent, child, spouse, employee, friend, etc.) 3   To what extent are you able to carry out your everyday physical activities such as walking, climbing stairs, carrying groceries, or moving a chair? 4   In the past 7 days, how often have you been bothered by emotional problems such as feeling anxious, depressed, or irritable? 3   In the past 7 days, how would you rate your fatigue on average? 4   In the past 7 days, how would you rate your pain on average, where 0 means no pain, and 10 means worst imaginable pain? 7   Global Mental Health Score 12    Global Physical Health Score 11    PROMIS TOTAL - SUBSCORES 23        Patient-reported     [place] Suicide Severity Rating Scale (Lifetime/Recent)      6/3/2025     9:20 AM   [place] Suicide Severity Rating (Lifetime/Recent)   1. Wish to be Dead (Lifetime) N   1. Wish to be Dead (Past 1 Month) N   2. Non-Specific Active Suicidal Thoughts (Lifetime) N   Actual Attempt (Lifetime) N   Has subject engaged in non-suicidal self-injurious behavior? (Lifetime) N   Interrupted Attempts (Lifetime) N   Aborted or Self-Interrupted Attempt (Lifetime) N   Preparatory Acts or Behavior (Lifetime) N   Calculated C-SSRS Risk Score (Lifetime/Recent) No Risk Indicated         ASSESSMENT: Current Emotional / Mental Status (status of significant symptoms):   Risk status (Self / Other harm or suicidal ideation)   Patient denies current fears or concerns for personal safety.   Patient denies current or recent suicidal ideation or behaviors.   Patient denies current or recent homicidal ideation or behaviors.   Patient denies current or recent self injurious behavior or ideation.   Patient denies other safety concerns.   Patient reports there has been no change in risk factors since their last session.     Patient reports there has been no change in protective factors since their last session.     Recommended that  patient call 911 or go to the local ED should there be a change in any of these risk factors     Appearance:   Appropriate    Eye Contact:   Good    Psychomotor Behavior: Retarded (Slowed)    Attitude:   Cooperative    Orientation:   All   Speech    Rate / Production: Emotional Talkative    Volume:  Normal    Mood:    Anxious  Depressed    Affect:    Tearful Worrisome    Thought Content:  Rumination    Thought Form:  Coherent    Insight:    External locus     Medication Review:   No changes to current psychiatric medication(s)     Medication Compliance:   Yes     Changes in Health Issues:   None reported     Chemical Use Review:   Substance Use: Chemical use reviewed, no active concerns identified      Tobacco Use: No current tobacco use.      Diagnosis:  1. Generalized anxiety disorder        Collateral Reports Completed:   Not Applicable    PLAN: (Patient Tasks / Therapist Tasks / Other)  Notice and allow feelings of grief and sadness  Review treatment plan goals      Luzmaria Joel,, [facility]    ______________________________________________________________________    Individual Treatment Plan    Patient's Name: Autumn Mason  YOB: 1960    Date of Creation: 6/17/2025  Date Treatment Plan Last Reviewed/Revised: 6/17/2025    DSM5 Diagnoses: 300.02 (F41.1) Generalized Anxiety Disorder  Psychosocial / Contextual Factors: Medical complexities, Trauma history, Relationship concerns, Interpersonal concerns, Caregiver, Parent child dynamics, Grief/loss.  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    Anticipated number of session for this episode of  "care: 9-12 sessions  Anticipation frequency of session: Weekly  Anticipated Duration of each session: 53 or more minutes  Treatment plan will be reviewed in 90 days or when goals have been changed.     Patient would like to \"find out how to be at peace with people who have been jerks to me in the past.   Practice asking for help and accepting reciving from others.    [place] of me, and focus on my mental and emotional health and and practice letting go.\" In cluding gettting back into exercise, getting my finances in order, get more lsleep, selling   Eat healthier, do something friend, keep in touch with friends    MeasurableTreatment Goal(s) related to diagnosis / functional impairment(s)  Goal 1: Patient will reduce BRIEN-7 by at least 2 points.    I will know I've met my goal when find out how to be at peace with people who have been jerks to me in the past, I'm able to ask for help and accept receiving help from others.      Objective #A (Patient Action)    Patient will identify at least 2 initial signs or symptoms of anxiety.   Status: New - Date: 6/17/2025     Intervention(s)  Therapist will teach core mindfulness skills (What & How).     Objective #B  Patient will use at least 3 coping skills for anxiety management in the next 12 weeks.   Status: New - Date: 6/17/2025     Intervention(s)  Therapist will teach and assign homework for emotion regulation and distress tolerance.      Objective #C  Patient will  practice at least 2 new interpersonal effectiveness skills.   Status: New - Date: 6/17/2025     Intervention(s)  Therapist will role-play effective communication skills.         Patient has reviewed and agreed to the above plan.      Luzmaria Joel MA [facility]  June 17, 2025   "

## 2025-06-24 ENCOUNTER — OFFICE VISIT (OUTPATIENT)
Dept: PSYCHOLOGY | Facility: CLINIC | Age: 65
End: 2025-06-24
Payer: MEDICARE

## 2025-06-24 DIAGNOSIS — F41.1 GENERALIZED ANXIETY DISORDER: Primary | ICD-10-CM

## 2025-06-24 PROCEDURE — 90837 PSYTX W PT 60 MINUTES: CPT | Performed by: PSYCHOLOGIST

## 2025-06-24 NOTE — PROGRESS NOTES
[facility]                                     Progress Note    Patient Name: Autumn Mason  Date: 6/24/2025         Service Type: Individual      Session Start Time: 11:01  Session End Time: 11:55     Session Length: 54 minutes    Session #: 4    Attendees: Client    Service Modality:  In-person    DATA Extended Session (53+ minutes): PROLONGED SERVICE IN THE OUTPATIENT SETTING REQUIRING DIRECT (FACE-TO-FACE) PATIENT CONTACT BEYOND THE USUAL SERVICE:    - Patient's presenting concerns require more intensive intervention than could be completed within the usual service  Interactive Complexity: No  Crisis: No       Progress Since Last Session (Related to Symptoms / Goals / Homework):   Symptoms: Improving patient has been organizing her finances wince her last therapy session    Homework: Achieved / completed to satisfaction      Episode of Care Goals: Satisfactory progress - ACTION (Actively working towards change); Intervened by reinforcing change plan / affirming steps taken     Current / Ongoing Stressors and Concerns:     Patient and provider met for an individual therapy session today. Patient and provider reviewed her therapy times and came up with a plan for her to rearrange her therapy times as needed. Provider and patient discussed values that are important to her. In terms of values, patient reports the following as important to her: Forgiveness/ self- forgiveness, honesty, kindness, respect/ self-respect, and responsibility. Treatment areas will focus on improving setting and maintaining boundaries, increase mindfulness and emotion regulation skills.     Treatment Objective(s) Addressed in This Session:   Identify patients self-defined values     Intervention:   ACT: Values and acceptance    Assessments completed prior to visit:  The following assessments were completed by patient for this visit:  PHQ9:       6/2/2025     2:21 PM   PHQ-9 SCORE   PHQ-9 Total Score MyChart 4 (Minimal  depression)   PHQ-9 Total Score 4        Patient-reported     GAD7:       6/3/2025     8:51 AM   BRIEN-7 SCORE   Total Score 5 (mild anxiety)   Total Score 5        Patient-reported     CAGE-AID:       5/31/2025     2:35 AM   CAGE-AID Total Score   Total Score 0    Total Score MyChart 0 (A total score of 2 or greater is considered clinically significant)       Patient-reported     PROMIS 10-Global Health (all questions and answers displayed):       5/31/2025     2:35 AM   PROMIS 10   In general, would you say your health is: Good   In general, would you say your quality of life is: Good   In general, how would you rate your physical health? Good   In general, how would you rate your mental health, including your mood and your ability to think? Good   In general, how would you rate your satisfaction with your social activities and relationships? Good   In general, please rate how well you carry out your usual social activities and roles Good   To what extent are you able to carry out your everyday physical activities such as walking, climbing stairs, carrying groceries, or moving a chair? Mostly   In the past 7 days, how often have you been bothered by emotional problems such as feeling anxious, depressed, or irritable? Sometimes   In the past 7 days, how would you rate your fatigue on average? Severe   In the past 7 days, how would you rate your pain on average, where 0 means no pain, and 10 means worst imaginable pain? 7   In general, would you say your health is: 3   In general, would you say your quality of life is: 3   In general, how would you rate your physical health? 3   In general, how would you rate your mental health, including your mood and your ability to think? 3   In general, how would you rate your satisfaction with your social activities and relationships? 3   In general, please rate how well you carry out your usual social activities and roles. (This includes activities at home, at work and in your  community, and responsibilities as a parent, child, spouse, employee, friend, etc.) 3   To what extent are you able to carry out your everyday physical activities such as walking, climbing stairs, carrying groceries, or moving a chair? 4   In the past 7 days, how often have you been bothered by emotional problems such as feeling anxious, depressed, or irritable? 3   In the past 7 days, how would you rate your fatigue on average? 4   In the past 7 days, how would you rate your pain on average, where 0 means no pain, and 10 means worst imaginable pain? 7   Global Mental Health Score 12    Global Physical Health Score 11    PROMIS TOTAL - SUBSCORES 23        Patient-reported     [place] Suicide Severity Rating Scale (Lifetime/Recent)      6/3/2025     9:20 AM   [place] Suicide Severity Rating (Lifetime/Recent)   1. Wish to be Dead (Lifetime) N   1. Wish to be Dead (Past 1 Month) N   2. Non-Specific Active Suicidal Thoughts (Lifetime) N   Actual Attempt (Lifetime) N   Has subject engaged in non-suicidal self-injurious behavior? (Lifetime) N   Interrupted Attempts (Lifetime) N   Aborted or Self-Interrupted Attempt (Lifetime) N   Preparatory Acts or Behavior (Lifetime) N   Calculated C-SSRS Risk Score (Lifetime/Recent) No Risk Indicated         ASSESSMENT: Current Emotional / Mental Status (status of significant symptoms):   Risk status (Self / Other harm or suicidal ideation)   Patient denies current fears or concerns for personal safety.   Patient denies current or recent suicidal ideation or behaviors.   Patient denies current or recent homicidal ideation or behaviors.   Patient denies current or recent self injurious behavior or ideation.   Patient denies other safety concerns.   Patient reports there has been no change in risk factors since their last session.     Patient reports there has been no change in protective factors since their last session.     Recommended that patient call 911 or go to the local ED  should there be a change in any of these risk factors     Appearance:   Appropriate    Eye Contact:   Good    Psychomotor Behavior: Restless    Attitude:   Cooperative    Orientation:   All   Speech    Rate / Production: Emotional Talkative    Volume:  Normal    Mood:    Anxious  Depressed  Irritable    Affect:    Constricted  Tearful   Thought Content:  Rumination    Thought Form:  Coherent  Tangential    Insight:    Fair      Medication Review:   No changes to current psychiatric medication(s)     Medication Compliance:   Yes     Changes in Health Issues:   None reported     Chemical Use Review:   Substance Use: Chemical use reviewed, no active concerns identified      Tobacco Use: No current tobacco use.      Diagnosis:  1. Generalized anxiety disorder        Collateral Reports Completed:   Not Applicable    PLAN: (Patient Tasks / Therapist Tasks / Other)  Practice acceptance of emotions and of others  Identify values and act in ways that are aligned with those values      Luzmaria Joel MA [facility]    ______________________________________________________________________    Individual Treatment Plan    Patient's Name: Autumn Mason  YOB: 1960    Date of Creation: 6/17/2025  Date Treatment Plan Last Reviewed/Revised: 6/17/2025    DSM5 Diagnoses: 300.02 (F41.1) Generalized Anxiety Disorder  Psychosocial / Contextual Factors: Medical complexities, Trauma history, Relationship concerns, Interpersonal concerns, Caregiver, Parent child dynamics, Grief/loss.  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    Anticipated number of session for this episode  "of care: 9-12 sessions  Anticipation frequency of session: Weekly  Anticipated Duration of each session: 53 or more minutes  Treatment plan will be reviewed in 90 days or when goals have been changed.     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 Lastly, patient would like to learn how to \"live with the pain and move forward.\"    MeasurableTreatment Goal(s) related to diagnosis / functional impairment(s)  Goal 1: Patient will reduce BRIEN-7 by at least 2 points.    I will know I've met my goal when find out how to be at peace with people who have been jerks to me in the past, I'm able to ask for help and accept receiving help from others.      Objective #A (Patient Action)    Patient will identify at least 2 initial signs or symptoms of anxiety.   Status: New - Date: 6/17/2025     Intervention(s)  Therapist will teach core mindfulness skills (What & How).     Objective #B  Patient will use at least 3 coping skills for anxiety management in the next 12 weeks.   Status: New - Date: 6/17/2025     Intervention(s)  Therapist will teach and assign homework for emotion regulation and distress tolerance.      Objective #C  Patient will  practice at least 2 new interpersonal effectiveness skills.   Status: New - Date: 6/17/2025     Intervention(s)  Therapist will role-play effective communication skills.       Patient has reviewed and agreed to the above plan.      Luzmaria Joel MA [facility]  June 24th, 2025   "

## 2025-06-30 ENCOUNTER — VIRTUAL VISIT (OUTPATIENT)
Dept: PSYCHOLOGY | Facility: CLINIC | Age: 65
End: 2025-06-30
Payer: MEDICARE

## 2025-06-30 DIAGNOSIS — F41.1 GENERALIZED ANXIETY DISORDER: Primary | ICD-10-CM

## 2025-06-30 PROCEDURE — 90837 PSYTX W PT 60 MINUTES: CPT | Mod: 95 | Performed by: PSYCHOLOGIST

## 2025-06-30 NOTE — PROGRESS NOTES
[facility]                                     Progress Note    Patient Name: Autumn CLIFFORD Mix  Date: 6/30/25          Service Type: Individual      Session Start Time: 9:02 Session End Time: 09:58     Session Length: 56 minutes    Session #: 5    Attendees: Client    Service Modality:  Video Visit:      Provider verified identity through the following two step process.  Patient provided:  Patient is known previously to provider    Telemedicine Visit: The patient's condition can be safely assessed and treated via synchronous audio and visual telemedicine encounter.      Reason for Telemedicine Visit: Services only offered telehealth    Originating Site (Patient Location): Patient's home    Distant Site (Provider Location): Provider Remote Setting- Home Office    Consent:  The patient/guardian has verbally consented to: the potential risks and benefits of telemedicine (video visit) versus in person care; bill my insurance or make self-payment for services provided; and responsibility for payment of non-covered services.     Patient would like the video invitation sent by:  My Chart    Mode of Communication:  Video Conference via [employer]well    Distant Location (Provider):  Off-site    As the provider I attest to compliance with applicable laws and regulations related to telemedicine.    DATA Extended Session (53+ minutes): PROLONGED SERVICE IN THE OUTPATIENT SETTING REQUIRING DIRECT (FACE-TO-FACE) PATIENT CONTACT BEYOND THE USUAL SERVICE:    - Patient's presenting concerns require more intensive intervention than could be completed within the usual service  Interactive Complexity: No  Crisis: No       Progress Since Last Session (Related to Symptoms / Goals / Homework):   Symptoms: Improving patient has been organizing her finances wince her last therapy session    Homework: Achieved / completed to satisfaction      Episode of Care Goals: Satisfactory progress - ACTION (Actively working towards change);  Intervened by reinforcing change plan / affirming steps taken     Current / Ongoing Stressors and Concerns:     Patient and provider met for a virtual individual therapy session today. Patient reports having a good weekend socializing with her family. She practiced identifying and naming emotions. Patient processed family relationships, connection and how she has been coping with being a caregiver to Gene. Patient discussed acceptance skills she has learned from Callie and reviewed her values, of forgiveness/ self- forgiveness, honesty, kindness, respect/ self-respect, and responsibility. Treatment areas will focus on improving setting and maintaining boundaries, increase mindfulness and emotion regulation skills.     Treatment Objective(s) Addressed in This Session:   Increase emotion regulation skills     Intervention:   DBT: Emotion regulation    Assessments completed prior to visit:  The following assessments were completed by patient for this visit:  PHQ9:       6/2/2025     2:21 PM   PHQ-9 SCORE   PHQ-9 Total Score MyChart 4 (Minimal depression)   PHQ-9 Total Score 4        Patient-reported     GAD7:       6/3/2025     8:51 AM   BRIEN-7 SCORE   Total Score 5 (mild anxiety)   Total Score 5        Patient-reported     CAGE-AID:       5/31/2025     2:35 AM   CAGE-AID Total Score   Total Score 0    Total Score MyChart 0 (A total score of 2 or greater is considered clinically significant)       Patient-reported     PROMIS 10-Global Health (all questions and answers displayed):       5/31/2025     2:35 AM   PROMIS 10   In general, would you say your health is: Good   In general, would you say your quality of life is: Good   In general, how would you rate your physical health? Good   In general, how would you rate your mental health, including your mood and your ability to think? Good   In general, how would you rate your satisfaction with your social activities and relationships? Good   In general, please rate how  well you carry out your usual social activities and roles Good   To what extent are you able to carry out your everyday physical activities such as walking, climbing stairs, carrying groceries, or moving a chair? Mostly   In the past 7 days, how often have you been bothered by emotional problems such as feeling anxious, depressed, or irritable? Sometimes   In the past 7 days, how would you rate your fatigue on average? Severe   In the past 7 days, how would you rate your pain on average, where 0 means no pain, and 10 means worst imaginable pain? 7   In general, would you say your health is: 3   In general, would you say your quality of life is: 3   In general, how would you rate your physical health? 3   In general, how would you rate your mental health, including your mood and your ability to think? 3   In general, how would you rate your satisfaction with your social activities and relationships? 3   In general, please rate how well you carry out your usual social activities and roles. (This includes activities at home, at work and in your community, and responsibilities as a parent, child, spouse, employee, friend, etc.) 3   To what extent are you able to carry out your everyday physical activities such as walking, climbing stairs, carrying groceries, or moving a chair? 4   In the past 7 days, how often have you been bothered by emotional problems such as feeling anxious, depressed, or irritable? 3   In the past 7 days, how would you rate your fatigue on average? 4   In the past 7 days, how would you rate your pain on average, where 0 means no pain, and 10 means worst imaginable pain? 7   Global Mental Health Score 12    Global Physical Health Score 11    PROMIS TOTAL - SUBSCORES 23        Patient-reported     [place] Suicide Severity Rating Scale (Lifetime/Recent)      6/3/2025     9:20 AM   [place] Suicide Severity Rating (Lifetime/Recent)   1. Wish to be Dead (Lifetime) N   1. Wish to be Dead (Past 1 Month)  N   2. Non-Specific Active Suicidal Thoughts (Lifetime) N   Actual Attempt (Lifetime) N   Has subject engaged in non-suicidal self-injurious behavior? (Lifetime) N   Interrupted Attempts (Lifetime) N   Aborted or Self-Interrupted Attempt (Lifetime) N   Preparatory Acts or Behavior (Lifetime) N   Calculated C-SSRS Risk Score (Lifetime/Recent) No Risk Indicated         ASSESSMENT: Current Emotional / Mental Status (status of significant symptoms):   Risk status (Self / Other harm or suicidal ideation)   Patient denies current fears or concerns for personal safety.   Patient denies current or recent suicidal ideation or behaviors.   Patient denies current or recent homicidal ideation or behaviors.   Patient denies current or recent self injurious behavior or ideation.   Patient denies other safety concerns.   Patient reports there has been no change in risk factors since their last session.     Patient reports there has been no change in protective factors since their last session.     Recommended that patient call 911 or go to the local ED should there be a change in any of these risk factors     Appearance:   Appropriate    Eye Contact:   Good    Psychomotor Behavior: Restless    Attitude:   Cooperative    Orientation:   All   Speech    Rate / Production: Emotional Talkative Interrupting    Volume:  Normal    Mood:    Anxious  Depressed    Affect:    Constricted  Tearful   Thought Content:  Rumination    Thought Form:  Coherent  Tangential    Insight:    Fair      Medication Review:   No changes to current psychiatric medication(s)     Medication Compliance:   Yes     Changes in Health Issues:   None reported     Chemical Use Review:   Substance Use: Chemical use reviewed, no active concerns identified      Tobacco Use: No current tobacco use.      Diagnosis:  1. Generalized anxiety disorder        Collateral Reports Completed:   Not Applicable    PLAN: (Patient Tasks / Therapist Tasks / Other)  Practice acceptance  "of emotions and of others  Schedule time to visit with friends and family  Review Callie Joel MA [facility]    ______________________________________________________________________    Individual Treatment Plan    Patient's Name: Autumn Mason  YOB: 1960    Date of Creation: 6/17/2025  Date Treatment Plan Last Reviewed/Revised: 6/17/2025    DSM5 Diagnoses: 300.02 (F41.1) Generalized Anxiety Disorder  Psychosocial / Contextual Factors: Medical complexities, Trauma history, Relationship concerns, Interpersonal concerns, Caregiver, Parent child dynamics, Grief/loss.  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    Anticipated number of session for this episode of care: 9-12 sessions  Anticipation frequency of session: Weekly  Anticipated Duration of each session: 53 or more minutes  Treatment plan will be reviewed in 90 days or when goals have been changed.     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 Lastly, patient would like to learn how to \"live with the pain and move forward.\"    MeasurableTreatment Goal(s) related to diagnosis / functional impairment(s)  Goal 1: Patient will reduce BRIEN-7 by at least 2 points.    I will know " I've met my goal when find out how to be at peace with people who have been jerks to me in the past, I'm able to ask for help and accept receiving help from others.      Objective #A (Patient Action)    Patient will identify at least 2 initial signs or symptoms of anxiety.   Status: New - Date: 6/17/2025     Intervention(s)  Therapist will teach core mindfulness skills (What & How).     Objective #B  Patient will use at least 3 coping skills for anxiety management in the next 12 weeks.   Status: New - Date: 6/17/2025     Intervention(s)  Therapist will teach and assign homework for emotion regulation and distress tolerance.      Objective #C  Patient will  practice at least 2 new interpersonal effectiveness skills.   Status: New - Date: 6/17/2025     Intervention(s)  Therapist will role-play effective communication skills.       Patient has reviewed and agreed to the above plan.      Luzmaria Joel MA [facility]  June 30th, 2025

## 2025-07-08 ENCOUNTER — OFFICE VISIT (OUTPATIENT)
Dept: PSYCHOLOGY | Facility: CLINIC | Age: 65
End: 2025-07-08
Payer: MEDICARE

## 2025-07-08 DIAGNOSIS — F41.1 GENERALIZED ANXIETY DISORDER: Primary | ICD-10-CM

## 2025-07-08 PROCEDURE — 90837 PSYTX W PT 60 MINUTES: CPT | Performed by: PSYCHOLOGIST

## 2025-07-08 NOTE — PROGRESS NOTES
[facility]                                     Progress Note    Patient Name: Autumn Mason  Date: 7/08/25          Service Type: Individual      Session Start Time: 2:05  Session End Time: 02:58     Session Length: 53 minutes    Session #: 6    Attendees: Client    Service Modality:  In-person    DATA Extended Session (53+ minutes): PROLONGED SERVICE IN THE OUTPATIENT SETTING REQUIRING DIRECT (FACE-TO-FACE) PATIENT CONTACT BEYOND THE USUAL SERVICE:    - Patient's presenting concerns require more intensive intervention than could be completed within the usual service  Interactive Complexity: No  Crisis: No       Progress Since Last Session (Related to Symptoms / Goals / Homework):   Symptoms: Improving      Homework: Achieved / completed to satisfaction      Episode of Care Goals: Satisfactory progress - ACTION (Actively working towards change); Intervened by reinforcing change plan / affirming steps taken     Current / Ongoing Stressors and Concerns:     Patient and provider met for an individual therapy session today. Patient discussed health changes she has been making and she would like to continue to work on in terms of eating more vegetables. Patient dicussed social activities she has been engaging in. She processed her husbands health decline and grief she has been experiencing with this. In terms of values, patient reports the following as important to her: Forgiveness/ self- forgiveness, honesty, kindness, respect/ self-respect, and responsibility.      Treatment Objective(s) Addressed in This Session:   Identify emotions and increase practice of acceptance     Intervention:   ACT: Values and acceptance    Assessments completed prior to visit:  The following assessments were completed by patient for this visit:  PHQ9:       6/2/2025     2:21 PM   PHQ-9 SCORE   PHQ-9 Total Score MyChart 4 (Minimal depression)   PHQ-9 Total Score 4        Patient-reported     GAD7:       6/3/2025     8:51  AM   BRIEN-7 SCORE   Total Score 5 (mild anxiety)   Total Score 5        Patient-reported     CAGE-AID:       5/31/2025     2:35 AM   CAGE-AID Total Score   Total Score 0    Total Score MyChart 0 (A total score of 2 or greater is considered clinically significant)       Patient-reported     PROMIS 10-Global Health (all questions and answers displayed):       5/31/2025     2:35 AM   PROMIS 10   In general, would you say your health is: Good   In general, would you say your quality of life is: Good   In general, how would you rate your physical health? Good   In general, how would you rate your mental health, including your mood and your ability to think? Good   In general, how would you rate your satisfaction with your social activities and relationships? Good   In general, please rate how well you carry out your usual social activities and roles Good   To what extent are you able to carry out your everyday physical activities such as walking, climbing stairs, carrying groceries, or moving a chair? Mostly   In the past 7 days, how often have you been bothered by emotional problems such as feeling anxious, depressed, or irritable? Sometimes   In the past 7 days, how would you rate your fatigue on average? Severe   In the past 7 days, how would you rate your pain on average, where 0 means no pain, and 10 means worst imaginable pain? 7   In general, would you say your health is: 3   In general, would you say your quality of life is: 3   In general, how would you rate your physical health? 3   In general, how would you rate your mental health, including your mood and your ability to think? 3   In general, how would you rate your satisfaction with your social activities and relationships? 3   In general, please rate how well you carry out your usual social activities and roles. (This includes activities at home, at work and in your community, and responsibilities as a parent, child, spouse, employee, friend, etc.) 3   To  what extent are you able to carry out your everyday physical activities such as walking, climbing stairs, carrying groceries, or moving a chair? 4   In the past 7 days, how often have you been bothered by emotional problems such as feeling anxious, depressed, or irritable? 3   In the past 7 days, how would you rate your fatigue on average? 4   In the past 7 days, how would you rate your pain on average, where 0 means no pain, and 10 means worst imaginable pain? 7   Global Mental Health Score 12    Global Physical Health Score 11    PROMIS TOTAL - SUBSCORES 23        Patient-reported     [place] Suicide Severity Rating Scale (Lifetime/Recent)      6/3/2025     9:20 AM   [place] Suicide Severity Rating (Lifetime/Recent)   1. Wish to be Dead (Lifetime) N   1. Wish to be Dead (Past 1 Month) N   2. Non-Specific Active Suicidal Thoughts (Lifetime) N   Actual Attempt (Lifetime) N   Has subject engaged in non-suicidal self-injurious behavior? (Lifetime) N   Interrupted Attempts (Lifetime) N   Aborted or Self-Interrupted Attempt (Lifetime) N   Preparatory Acts or Behavior (Lifetime) N   Calculated C-SSRS Risk Score (Lifetime/Recent) No Risk Indicated         ASSESSMENT: Current Emotional / Mental Status (status of significant symptoms):   Risk status (Self / Other harm or suicidal ideation)   Patient denies current fears or concerns for personal safety.   Patient denies current or recent suicidal ideation or behaviors.   Patient denies current or recent homicidal ideation or behaviors.   Patient denies current or recent self injurious behavior or ideation.   Patient denies other safety concerns.   Patient reports there has been no change in risk factors since their last session.     Patient reports there has been no change in protective factors since their last session.     Recommended that patient call 911 or go to the local ED should there be a change in any of these risk factors     Appearance:   Appropriate    Eye  Contact:   Good    Psychomotor Behavior: Restless    Attitude:   Cooperative  Attentive   Orientation:   All   Speech    Rate / Production: Emotional Talkative    Volume:  Normal    Mood:    Anxious  Depressed    Affect:    Constricted  Tearful   Thought Content:  Rumination    Thought Form:  Coherent  Tangential    Insight:    Fair      Medication Review:   No changes to current psychiatric medication(s)     Medication Compliance:   Yes     Changes in Health Issues:   None reported     Chemical Use Review:   Substance Use: Chemical use reviewed, no active concerns identified      Tobacco Use: No current tobacco use.      Diagnosis:  1. Generalized anxiety disorder          Collateral Reports Completed:   Not Applicable    PLAN: (Patient Tasks / Therapist Tasks / Other)  Buy and have vegetables   Delete phone games      Luzmaria Joel MA [facility]    ______________________________________________________________________    Individual Treatment Plan    Patient's Name: Autumn Mason  YOB: 1960    Date of Creation: 6/17/2025  Date Treatment Plan Last Reviewed/Revised: 6/17/2025    DSM5 Diagnoses: 300.02 (F41.1) Generalized Anxiety Disorder  Psychosocial / Contextual Factors: Medical complexities, Trauma history, Relationship concerns, Interpersonal concerns, Caregiver, Parent child dynamics, Grief/loss.  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    Anticipated number of session for this episode of care: 9-12 sessions  Anticipation frequency of session: Weekly  Anticipated Duration of each session: 53 or more minutes  Treatment plan will be reviewed  "in 90 days or when goals have been changed.     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 Lastly, patient would like to learn how to \"live with the pain and move forward.\"    MeasurableTreatment Goal(s) related to diagnosis / functional impairment(s)  Goal 1: Patient will reduce BRIEN-7 by at least 2 points.    I will know I've met my goal when find out how to be at peace with people who have been jerks to me in the past, I'm able to ask for help and accept receiving help from others.      Objective #A (Patient Action)    Patient will identify at least 2 initial signs or symptoms of anxiety.   Status: New - Date: 6/17/2025     Intervention(s)  Therapist will teach core mindfulness skills (What & How).     Objective #B  Patient will use at least 3 coping skills for anxiety management in the next 12 weeks.   Status: New - Date: 6/17/2025     Intervention(s)  Therapist will teach and assign homework for emotion regulation and distress tolerance.      Objective #C  Patient will  practice at least 2 new interpersonal effectiveness skills.   Status: New - Date: 6/17/2025     Intervention(s)  Therapist will role-play effective communication skills.       Patient has reviewed and agreed to the above plan.      Luzmaria Joel MA Psych[facility]  July 8th, 2025   "

## 2025-07-15 ENCOUNTER — OFFICE VISIT (OUTPATIENT)
Dept: PSYCHOLOGY | Facility: CLINIC | Age: 65
End: 2025-07-15
Payer: MEDICARE

## 2025-07-15 DIAGNOSIS — F41.1 GENERALIZED ANXIETY DISORDER: Primary | ICD-10-CM

## 2025-07-15 PROCEDURE — 90837 PSYTX W PT 60 MINUTES: CPT | Performed by: PSYCHOLOGIST

## 2025-07-15 NOTE — PROGRESS NOTES
Ongoing SW/CM Assessment/Plan of Care Note     See SW/CM flowsheets for goals and other objective data.    Progress note:  MSW placed call to [facility] per 's request to see when pt received last dose of Abilify IM.  Pt received 662mg IM on 10/28 and scheduled for next dose on 11/30.  MSW informed unit nurse.              CM/SW team to continue to follow for discharge needs.           "[facility]                                     Progress Note    Patient Name: Autumn Mason  Date: 7/15/25          Service Type: Individual      Session Start Time: 01:59  Session End Time: 003:00     Session Length: 61 minutes    Session #:  7    Attendees: Client    Service Modality:  In-person    DATA Extended Session (53+ minutes): PROLONGED SERVICE IN THE OUTPATIENT SETTING REQUIRING DIRECT (FACE-TO-FACE) PATIENT CONTACT BEYOND THE USUAL SERVICE:    - Patient's presenting concerns require more intensive intervention than could be completed within the usual service  Interactive Complexity: No  Crisis: No       Progress Since Last Session (Related to Symptoms / Goals / Homework):   Symptoms: Improving      Homework: Achieved / completed to satisfaction      Episode of Care Goals: Satisfactory progress - ACTION (Actively working towards change); Intervened by reinforcing change plan / affirming steps taken     Current / Ongoing Stressors and Concerns:     Patient and provider met for an individual therapy session today. Patient discussed she has been slowing down and avoiding rushing. Patient reports eating more vegetables and socialization. During today's session, patient and provider discussed the safe and calm state. Patient practiced noting signs she is out of her window of tolerance and actions that can help her remain \"balanced\" and in her safe and calm state.     Treatment Objective(s) Addressed in This Session:   Identify emotions and increase emotion regulation skills practice     Intervention:   Safe and calm state    Assessments completed prior to visit:  The following assessments were completed by patient for this visit:  PHQ9:       6/2/2025     2:21 PM   PHQ-9 SCORE   PHQ-9 Total Score MyChart 4 (Minimal depression)   PHQ-9 Total Score 4        Patient-reported     GAD7:       6/3/2025     8:51 AM   BRIEN-7 SCORE   Total Score 5 (mild anxiety)   Total Score 5        " Patient-reported     CAGE-AID:       5/31/2025     2:35 AM   CAGE-AID Total Score   Total Score 0    Total Score MyChart 0 (A total score of 2 or greater is considered clinically significant)       Patient-reported     PROMIS 10-Global Health (all questions and answers displayed):       5/31/2025     2:35 AM   PROMIS 10   In general, would you say your health is: Good   In general, would you say your quality of life is: Good   In general, how would you rate your physical health? Good   In general, how would you rate your mental health, including your mood and your ability to think? Good   In general, how would you rate your satisfaction with your social activities and relationships? Good   In general, please rate how well you carry out your usual social activities and roles Good   To what extent are you able to carry out your everyday physical activities such as walking, climbing stairs, carrying groceries, or moving a chair? Mostly   In the past 7 days, how often have you been bothered by emotional problems such as feeling anxious, depressed, or irritable? Sometimes   In the past 7 days, how would you rate your fatigue on average? Severe   In the past 7 days, how would you rate your pain on average, where 0 means no pain, and 10 means worst imaginable pain? 7   In general, would you say your health is: 3   In general, would you say your quality of life is: 3   In general, how would you rate your physical health? 3   In general, how would you rate your mental health, including your mood and your ability to think? 3   In general, how would you rate your satisfaction with your social activities and relationships? 3   In general, please rate how well you carry out your usual social activities and roles. (This includes activities at home, at work and in your community, and responsibilities as a parent, child, spouse, employee, friend, etc.) 3   To what extent are you able to carry out your everyday physical activities  such as walking, climbing stairs, carrying groceries, or moving a chair? 4   In the past 7 days, how often have you been bothered by emotional problems such as feeling anxious, depressed, or irritable? 3   In the past 7 days, how would you rate your fatigue on average? 4   In the past 7 days, how would you rate your pain on average, where 0 means no pain, and 10 means worst imaginable pain? 7   Global Mental Health Score 12    Global Physical Health Score 11    PROMIS TOTAL - SUBSCORES 23        Patient-reported     [place] Suicide Severity Rating Scale (Lifetime/Recent)      6/3/2025     9:20 AM   [place] Suicide Severity Rating (Lifetime/Recent)   1. Wish to be Dead (Lifetime) N   1. Wish to be Dead (Past 1 Month) N   2. Non-Specific Active Suicidal Thoughts (Lifetime) N   Actual Attempt (Lifetime) N   Has subject engaged in non-suicidal self-injurious behavior? (Lifetime) N   Interrupted Attempts (Lifetime) N   Aborted or Self-Interrupted Attempt (Lifetime) N   Preparatory Acts or Behavior (Lifetime) N   Calculated C-SSRS Risk Score (Lifetime/Recent) No Risk Indicated         ASSESSMENT: Current Emotional / Mental Status (status of significant symptoms):   Risk status (Self / Other harm or suicidal ideation)   Patient denies current fears or concerns for personal safety.   Patient denies current or recent suicidal ideation or behaviors.   Patient denies current or recent homicidal ideation or behaviors.   Patient denies current or recent self injurious behavior or ideation.   Patient denies other safety concerns.   Patient reports there has been no change in risk factors since their last session.     Patient reports there has been no change in protective factors since their last session.     Recommended that patient call 911 or go to the local ED should there be a change in any of these risk factors     Appearance:   Appropriate    Eye Contact:   Good    Psychomotor Behavior: Restless  "   Attitude:   Interrupting    Orientation:   All   Speech    Rate / Production: Emotional Talkative    Volume:  Normal    Mood:    Anxious  Depressed  Sad    Affect:    Constricted  Tearful Worrisome    Thought Content:  Rumination  Obsessions   Thought Form:  Tangential Difficult to follow   Insight:    External locus     Medication Review:   No changes to current psychiatric medication(s)     Medication Compliance:   Yes     Changes in Health Issues:   None reported     Chemical Use Review:   Substance Use: Chemical use reviewed, no active concerns identified      Tobacco Use: No current tobacco use.      Diagnosis:  1. Generalized anxiety disorder      Collateral Reports Completed:   Not Applicable    PLAN: (Patient Tasks / Therapist Tasks / Other)  Continue to practice acceptance of what cannot be changed.  Notice when in a safe and calm state  Practice actions that help increase time in the safe and calm state such as, forgiveness, letting go, one thing at a time, diet and eercise, and \"choosing my battles.\"      Luzmaria Joel MA [facility]    ______________________________________________________________________    Individual Treatment Plan    Patient's Name: Autumn Mason  YOB: 1960    Date of Creation: 6/17/2025  Date Treatment Plan Last Reviewed/Revised: 6/17/2025    DSM5 Diagnoses: 300.02 (F41.1) Generalized Anxiety Disorder  Psychosocial / Contextual Factors: Medical complexities, Trauma history, Relationship concerns, Interpersonal concerns, Caregiver, Parent child dynamics, Grief/loss.  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    Anticipated number of session for this episode of care: 9-12 sessions  Anticipation frequency of session: Weekly  Anticipated Duration of each session: 53 or more minutes  Treatment plan will be reviewed in 90 days or when goals have been changed.     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 Lastly, patient would like to learn how to \"live with the pain and move forward.\" In terms of values, patient reports the following as important to her: Forgiveness/ self- forgiveness, honesty, kindness, respect/ self-respect, and responsibility.     MeasurableTreatment Goal(s) related to diagnosis / functional impairment(s)  Goal 1: Patient will reduce BRIEN-7 by at least 2 points.    I will know I've met my goal when find out how to be at peace with people who have been jerks to me in the past, I'm able to ask for help and accept receiving help from others.      Objective #A (Patient Action)    Patient will identify at least 2 initial signs or symptoms of anxiety.   Status: New - Date: 6/17/2025     Intervention(s)  Therapist will teach core mindfulness skills (What & How).     Objective #B  Patient will use at least 3 coping skills for anxiety management in the next 12 weeks.   Status: New - Date: 6/17/2025     Intervention(s)  Therapist will teach and assign homework for emotion regulation and distress tolerance.      Objective #C  Patient will  practice at least 2 new interpersonal effectiveness skills.   Status: New - Date: 6/17/2025     Intervention(s)  Therapist will role-play effective communication skills. "       Patient has reviewed and agreed to the above plan.      Luzmaria Joel MA [facility]  July 15th, 2025

## 2025-07-22 ENCOUNTER — OFFICE VISIT (OUTPATIENT)
Dept: PSYCHOLOGY | Facility: CLINIC | Age: 65
End: 2025-07-22
Payer: MEDICARE

## 2025-07-22 DIAGNOSIS — F41.1 GENERALIZED ANXIETY DISORDER: Primary | ICD-10-CM

## 2025-07-22 PROCEDURE — 90837 PSYTX W PT 60 MINUTES: CPT | Performed by: PSYCHOLOGIST

## 2025-07-22 NOTE — PROGRESS NOTES
[facility]                                     Progress Note    Patient Name: Autumn CLIFFORD Mix  Date: 7/22/25          Service Type: Individual      Session Start Time: 01:59  Session End Time: 03:00     Session Length: 61 minutes    Session #:  8    Attendees: Client    Service Modality:  In-person    DATA Extended Session (53+ minutes): PROLONGED SERVICE IN THE OUTPATIENT SETTING REQUIRING DIRECT (FACE-TO-FACE) PATIENT CONTACT BEYOND THE USUAL SERVICE:    - Patient's presenting concerns require more intensive intervention than could be completed within the usual service  Interactive Complexity: No  Crisis: No       Progress Since Last Session (Related to Symptoms / Goals / Homework):   Symptoms: Improving      Homework: Achieved / completed to satisfaction      Episode of Care Goals: Satisfactory progress - ACTION (Actively working towards change); Intervened by reinforcing change plan / affirming steps taken     Current / Ongoing Stressors and Concerns:     Patient and provider met for an individual therapy session today. Patient processed the idea of forgiveness when it comes to her daughter in law. Patient was able to identify the feeling of anger, hurt and sadness. Patient and provider explored primary and secondary emotions and the skill of letting go.     Treatment Objective(s) Addressed in This Session:   Identify emotions and increase emotion regulation skills practice     Intervention:   Identifying and naming emotions and values    Assessments completed prior to visit:  The following assessments were completed by patient for this visit:  PHQ9:       6/2/2025     2:21 PM   PHQ-9 SCORE   PHQ-9 Total Score MyChart 4 (Minimal depression)   PHQ-9 Total Score 4        Patient-reported     GAD7:       6/3/2025     8:51 AM   BRIEN-7 SCORE   Total Score 5 (mild anxiety)   Total Score 5        Patient-reported     CAGE-AID:       5/31/2025     2:35 AM   CAGE-AID Total Score   Total Score 0    Total  Score MyChart 0 (A total score of 2 or greater is considered clinically significant)       Patient-reported     PROMIS 10-Global Health (all questions and answers displayed):       5/31/2025     2:35 AM   PROMIS 10   In general, would you say your health is: Good   In general, would you say your quality of life is: Good   In general, how would you rate your physical health? Good   In general, how would you rate your mental health, including your mood and your ability to think? Good   In general, how would you rate your satisfaction with your social activities and relationships? Good   In general, please rate how well you carry out your usual social activities and roles Good   To what extent are you able to carry out your everyday physical activities such as walking, climbing stairs, carrying groceries, or moving a chair? Mostly   In the past 7 days, how often have you been bothered by emotional problems such as feeling anxious, depressed, or irritable? Sometimes   In the past 7 days, how would you rate your fatigue on average? Severe   In the past 7 days, how would you rate your pain on average, where 0 means no pain, and 10 means worst imaginable pain? 7   In general, would you say your health is: 3   In general, would you say your quality of life is: 3   In general, how would you rate your physical health? 3   In general, how would you rate your mental health, including your mood and your ability to think? 3   In general, how would you rate your satisfaction with your social activities and relationships? 3   In general, please rate how well you carry out your usual social activities and roles. (This includes activities at home, at work and in your community, and responsibilities as a parent, child, spouse, employee, friend, etc.) 3   To what extent are you able to carry out your everyday physical activities such as walking, climbing stairs, carrying groceries, or moving a chair? 4   In the past 7 days, how often  have you been bothered by emotional problems such as feeling anxious, depressed, or irritable? 3   In the past 7 days, how would you rate your fatigue on average? 4   In the past 7 days, how would you rate your pain on average, where 0 means no pain, and 10 means worst imaginable pain? 7   Global Mental Health Score 12    Global Physical Health Score 11    PROMIS TOTAL - SUBSCORES 23        Patient-reported     [place] Suicide Severity Rating Scale (Lifetime/Recent)      6/3/2025     9:20 AM   [place] Suicide Severity Rating (Lifetime/Recent)   1. Wish to be Dead (Lifetime) N   1. Wish to be Dead (Past 1 Month) N   2. Non-Specific Active Suicidal Thoughts (Lifetime) N   Actual Attempt (Lifetime) N   Has subject engaged in non-suicidal self-injurious behavior? (Lifetime) N   Interrupted Attempts (Lifetime) N   Aborted or Self-Interrupted Attempt (Lifetime) N   Preparatory Acts or Behavior (Lifetime) N   Calculated C-SSRS Risk Score (Lifetime/Recent) No Risk Indicated         ASSESSMENT: Current Emotional / Mental Status (status of significant symptoms):   Risk status (Self / Other harm or suicidal ideation)   Patient denies current fears or concerns for personal safety.   Patient denies current or recent suicidal ideation or behaviors.   Patient denies current or recent homicidal ideation or behaviors.   Patient denies current or recent self injurious behavior or ideation.   Patient denies other safety concerns.   Patient reports there has been no change in risk factors since their last session.     Patient reports there has been no change in protective factors since their last session.     Recommended that patient call 911 or go to the local ED should there be a change in any of these risk factors     Appearance:   Appropriate    Eye Contact:   Good    Psychomotor Behavior: Restless    Attitude:   Cooperative  Attentive Interrupting    Orientation:   All   Speech    Rate / Production: Emotional  "Talkative    Volume:  Normal    Mood:    Anxious  Depressed  Sad    Affect:    Constricted  Tearful Worrisome    Thought Content:  Rumination    Thought Form:  Tangential    Insight:    Fair      Medication Review:   No changes to current psychiatric medication(s)     Medication Compliance:   Yes     Changes in Health Issues:   None reported     Chemical Use Review:   Substance Use: Chemical use reviewed, no active concerns identified      Tobacco Use: No current tobacco use.      Diagnosis:  1. Generalized anxiety disorder        Collateral Reports Completed:   Not Applicable    PLAN: (Patient Tasks / Therapist Tasks / Other)  Continue to practice acceptance of what cannot be changed.  Notice when in a safe and calm state  Practice actions that help increase time in the safe and calm state such as, forgiveness, letting go, one thing at a time, diet and exercise, and \"choosing my battles.\"      Luzmaria Joel MA [facility]    ______________________________________________________________________    Individual Treatment Plan    Patient's Name: Autumn Mason  YOB: 1960    Date of Creation: 6/17/2025  Date Treatment Plan Last Reviewed/Revised: 6/17/2025    DSM5 Diagnoses: 300.02 (F41.1) Generalized Anxiety Disorder  Psychosocial / Contextual Factors: Medical complexities, Trauma history, Relationship concerns, Interpersonal concerns, Caregiver, Parent child dynamics, Grief/loss.  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    Anticipated number of session for this episode of care: 9-12 sessions  Anticipation frequency of session: Weekly  Anticipated " "Duration of each session: 53 or more minutes  Treatment plan will be reviewed in 90 days or when goals have been changed.     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 Lastly, patient would like to learn how to \"live with the pain and move forward.\" In terms of values, patient reports the following as important to her: Forgiveness/ self- forgiveness, honesty, kindness, respect/ self-respect, and responsibility.     MeasurableTreatment Goal(s) related to diagnosis / functional impairment(s)  Goal 1: Patient will reduce BRIEN-7 by at least 2 points.    I will know I've met my goal when find out how to be at peace with people who have been jerks to me in the past, I'm able to ask for help and accept receiving help from others.      Objective #A (Patient Action)    Patient will identify at least 2 initial signs or symptoms of anxiety.   Status: New - Date: 6/17/2025     Intervention(s)  Therapist will teach core mindfulness skills (What & How).     Objective #B  Patient will use at least 3 coping skills for anxiety management in the next 12 weeks.   Status: New - Date: 6/17/2025     Intervention(s)  Therapist will teach and assign homework for emotion regulation and distress tolerance.      Objective #C  Patient will  practice at least 2 new interpersonal effectiveness skills.   Status: New - Date: 6/17/2025     Intervention(s)  Therapist will role-play effective communication skills.       Patient has reviewed and agreed to the above plan.      Luzmaria Joel MA [facility]  July 22nd, 2025   "

## 2025-07-29 ENCOUNTER — OFFICE VISIT (OUTPATIENT)
Dept: PSYCHOLOGY | Facility: CLINIC | Age: 65
End: 2025-07-29
Payer: MEDICARE

## 2025-07-29 DIAGNOSIS — F41.1 GENERALIZED ANXIETY DISORDER: Primary | ICD-10-CM

## 2025-07-29 PROCEDURE — 90837 PSYTX W PT 60 MINUTES: CPT | Performed by: PSYCHOLOGIST

## 2025-07-29 NOTE — PROGRESS NOTES
[facility]                                     Progress Note    Patient Name: Autumn Mason  Date: 7/29/25          Service Type: Individual      Session Start Time: 02:02  Session End Time: 02:57     Session Length: 55 minutes    Session #:  9    Attendees: Client    Service Modality:  In-person    DATA Extended Session (53+ minutes): PROLONGED SERVICE IN THE OUTPATIENT SETTING REQUIRING DIRECT (FACE-TO-FACE) PATIENT CONTACT BEYOND THE USUAL SERVICE:    - Patient's presenting concerns require more intensive intervention than could be completed within the usual service  Interactive Complexity: No  Crisis: No       Progress Since Last Session (Related to Symptoms / Goals / Homework):   Symptoms: Improving      Homework: Achieved / completed to satisfaction      Episode of Care Goals: Satisfactory progress - ACTION (Actively working towards change); Intervened by reinforcing change plan / affirming steps taken     Current / Ongoing Stressors and Concerns:     Patient and provider met for an individual therapy session today. 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 Patient also processed the benefits she 's noticed with socializing more and asking for help with care taking/ visiting her . Patient practiced being mindful of positives during today's session.     Treatment Objective(s) Addressed in This Session:   Increase mindfulness skills     Intervention:   Identifying and naming positive emotions    Assessments completed prior to visit:  The following assessments were completed by patient for this visit:  PHQ9:       6/2/2025     2:21 PM   PHQ-9 SCORE   PHQ-9 Total Score MyChart 4 (Minimal depression)   PHQ-9 Total Score 4        Patient-reported     GAD7:       6/3/2025     8:51 AM   BRIEN-7 SCORE   Total Score 5 (mild anxiety)   Total Score 5        Patient-reported     CAGE-AID:       5/31/2025     2:35 AM   CAGE-AID Total Score   Total Score 0     Total Score MyChart 0 (A total score of 2 or greater is considered clinically significant)       Patient-reported     PROMIS 10-Global Health (all questions and answers displayed):       5/31/2025     2:35 AM   PROMIS 10   In general, would you say your health is: Good   In general, would you say your quality of life is: Good   In general, how would you rate your physical health? Good   In general, how would you rate your mental health, including your mood and your ability to think? Good   In general, how would you rate your satisfaction with your social activities and relationships? Good   In general, please rate how well you carry out your usual social activities and roles Good   To what extent are you able to carry out your everyday physical activities such as walking, climbing stairs, carrying groceries, or moving a chair? Mostly   In the past 7 days, how often have you been bothered by emotional problems such as feeling anxious, depressed, or irritable? Sometimes   In the past 7 days, how would you rate your fatigue on average? Severe   In the past 7 days, how would you rate your pain on average, where 0 means no pain, and 10 means worst imaginable pain? 7   In general, would you say your health is: 3   In general, would you say your quality of life is: 3   In general, how would you rate your physical health? 3   In general, how would you rate your mental health, including your mood and your ability to think? 3   In general, how would you rate your satisfaction with your social activities and relationships? 3   In general, please rate how well you carry out your usual social activities and roles. (This includes activities at home, at work and in your community, and responsibilities as a parent, child, spouse, employee, friend, etc.) 3   To what extent are you able to carry out your everyday physical activities such as walking, climbing stairs, carrying groceries, or moving a chair? 4   In the past 7 days,  how often have you been bothered by emotional problems such as feeling anxious, depressed, or irritable? 3   In the past 7 days, how would you rate your fatigue on average? 4   In the past 7 days, how would you rate your pain on average, where 0 means no pain, and 10 means worst imaginable pain? 7   Global Mental Health Score 12    Global Physical Health Score 11    PROMIS TOTAL - SUBSCORES 23        Patient-reported     [place] Suicide Severity Rating Scale (Lifetime/Recent)      6/3/2025     9:20 AM   [place] Suicide Severity Rating (Lifetime/Recent)   1. Wish to be Dead (Lifetime) N   1. Wish to be Dead (Past 1 Month) N   2. Non-Specific Active Suicidal Thoughts (Lifetime) N   Actual Attempt (Lifetime) N   Has subject engaged in non-suicidal self-injurious behavior? (Lifetime) N   Interrupted Attempts (Lifetime) N   Aborted or Self-Interrupted Attempt (Lifetime) N   Preparatory Acts or Behavior (Lifetime) N   Calculated C-SSRS Risk Score (Lifetime/Recent) No Risk Indicated         ASSESSMENT: Current Emotional / Mental Status (status of significant symptoms):   Risk status (Self / Other harm or suicidal ideation)   Patient denies current fears or concerns for personal safety.   Patient denies current or recent suicidal ideation or behaviors.   Patient denies current or recent homicidal ideation or behaviors.   Patient denies current or recent self injurious behavior or ideation.   Patient denies other safety concerns.   Patient reports there has been no change in risk factors since their last session.     Patient reports there has been no change in protective factors since their last session.     Recommended that patient call 911 or go to the local ED should there be a change in any of these risk factors     Appearance:   Appropriate    Eye Contact:   Good    Psychomotor Behavior: Restless    Attitude:   Cooperative  Attentive   Orientation:   All   Speech    Rate / Production: Normal/ Responsive  Talkative    Volume:  Normal    Mood:    Anxious    Affect:    Bright  Worrisome    Thought Content:  Rumination    Thought Form:  Coherent  Tangential    Insight:    Good      Medication Review:   No changes to current psychiatric medication(s)     Medication Compliance:   Yes     Changes in Health Issues:   None reported     Chemical Use Review:   Substance Use: Chemical use reviewed, no active concerns identified      Tobacco Use: No current tobacco use.      Diagnosis:  1. Generalized anxiety disorder      Collateral Reports Completed:   Not Applicable    PLAN: (Patient Tasks / Therapist Tasks / Other)  Pay attention to positives in daily life  Continue to practice acceptance of what cannot be changed.  Notice when in a safe and calm state      Luzmaria Joel MA [facility]    ______________________________________________________________________    Individual Treatment Plan    Patient's Name: Autumn Mason  YOB: 1960    Date of Creation: 6/17/2025  Date Treatment Plan Last Reviewed/Revised: 6/17/2025    DSM5 Diagnoses: 300.02 (F41.1) Generalized Anxiety Disorder  Psychosocial / Contextual Factors: Medical complexities, Trauma history, Relationship concerns, Interpersonal concerns, Caregiver, Parent child dynamics, Grief/loss.  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    Anticipated number of session for this episode of care: 9-12 sessions  Anticipation frequency of session: Weekly  Anticipated Duration of each session: 53 or more minutes  Treatment plan will be reviewed in 90 days or when goals have been changed.     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 Lastly, patient would like to learn how to \"live with the pain and move forward.\" In terms of values, patient reports the following as important to her: Forgiveness/ self- forgiveness, honesty, kindness, respect/ self-respect, and responsibility.     MeasurableTreatment Goal(s) related to diagnosis / functional impairment(s)  Goal 1: Patient will reduce BRIEN-7 by at least 2 points.    I will know I've met my goal when find out how to be at peace with people who have been jerks to me in the past, I'm able to ask for help and accept receiving help from others.      Objective #A (Patient Action)    Patient will identify at least 2 initial signs or symptoms of anxiety.   Status: New - Date: 6/17/2025     Intervention(s)  Therapist will teach core mindfulness skills (What & How).     Objective #B  Patient will use at least 3 coping skills for anxiety management in the next 12 weeks.   Status: New - Date: 6/17/2025     Intervention(s)  Therapist will teach and assign homework for emotion regulation and distress tolerance.      Objective #C  Patient will  practice at least 2 new interpersonal effectiveness skills.   Status: New - Date: 6/17/2025     Intervention(s)  Therapist will role-play effective communication skills.       Patient has reviewed and agreed to the above plan.      Luzmaria Joel MA [facility]  July 29th, 2025   "

## 2025-08-05 ENCOUNTER — OFFICE VISIT (OUTPATIENT)
Dept: PSYCHOLOGY | Facility: CLINIC | Age: 65
End: 2025-08-05
Payer: MEDICARE

## 2025-08-05 DIAGNOSIS — F41.1 GENERALIZED ANXIETY DISORDER: Primary | ICD-10-CM

## 2025-08-05 PROCEDURE — 90837 PSYTX W PT 60 MINUTES: CPT | Performed by: PSYCHOLOGIST

## 2025-08-12 ENCOUNTER — OFFICE VISIT (OUTPATIENT)
Dept: PSYCHOLOGY | Facility: CLINIC | Age: 65
End: 2025-08-12
Payer: MEDICARE

## 2025-08-12 DIAGNOSIS — F41.1 GENERALIZED ANXIETY DISORDER: Primary | ICD-10-CM

## 2025-09-03 RX ORDER — EPINEPHRINE 1 MG/ML
0.3 INJECTION, SOLUTION INTRAMUSCULAR; SUBCUTANEOUS EVERY 5 MIN PRN
OUTPATIENT
Start: 2026-09-03

## 2025-09-03 RX ORDER — ALBUTEROL SULFATE 90 UG/1
1-2 INHALANT RESPIRATORY (INHALATION)
Start: 2026-09-03

## 2025-09-03 RX ORDER — ZOLEDRONIC ACID 0.05 MG/ML
5 INJECTION, SOLUTION INTRAVENOUS ONCE
Start: 2026-09-03

## 2025-09-03 RX ORDER — HEPARIN SODIUM (PORCINE) LOCK FLUSH IV SOLN 100 UNIT/ML 100 UNIT/ML
5 SOLUTION INTRAVENOUS
OUTPATIENT
Start: 2026-09-03

## 2025-09-03 RX ORDER — HEPARIN SODIUM,PORCINE 10 UNIT/ML
5-20 VIAL (ML) INTRAVENOUS DAILY PRN
OUTPATIENT
Start: 2026-09-03

## 2025-09-03 RX ORDER — DIPHENHYDRAMINE HYDROCHLORIDE 50 MG/ML
25 INJECTION INTRAMUSCULAR; INTRAVENOUS
Start: 2026-09-03

## 2025-09-03 RX ORDER — METHYLPREDNISOLONE SODIUM SUCCINATE 40 MG/ML
40 INJECTION INTRAMUSCULAR; INTRAVENOUS
Start: 2026-09-03

## 2025-09-03 RX ORDER — ACETAMINOPHEN 325 MG/1
650 TABLET ORAL
OUTPATIENT
Start: 2026-09-03

## 2025-09-03 RX ORDER — DIPHENHYDRAMINE HYDROCHLORIDE 50 MG/ML
50 INJECTION INTRAMUSCULAR; INTRAVENOUS
Start: 2026-09-03

## 2025-09-03 RX ORDER — ALBUTEROL SULFATE 0.83 MG/ML
2.5 SOLUTION RESPIRATORY (INHALATION)
OUTPATIENT
Start: 2026-09-03

## 2025-09-04 ENCOUNTER — INFUSION THERAPY VISIT (OUTPATIENT)
Dept: INFUSION THERAPY | Facility: CLINIC | Age: 65
End: 2025-09-04
Attending: INTERNAL MEDICINE
Payer: MEDICARE

## 2025-09-04 VITALS
OXYGEN SATURATION: 98 % | SYSTOLIC BLOOD PRESSURE: 154 MMHG | TEMPERATURE: 98.2 F | DIASTOLIC BLOOD PRESSURE: 95 MMHG | BODY MASS INDEX: 29.78 KG/M2 | HEART RATE: 78 BPM | HEIGHT: 64 IN | RESPIRATION RATE: 16 BRPM | WEIGHT: 174.4 LBS

## 2025-09-04 DIAGNOSIS — Z92.29 PERSONAL HISTORY OF DRUG THERAPY: ICD-10-CM

## 2025-09-04 DIAGNOSIS — M81.0 OSTEOPOROSIS WITHOUT CURRENT PATHOLOGICAL FRACTURE, UNSPECIFIED OSTEOPOROSIS TYPE: Primary | ICD-10-CM

## 2025-09-04 LAB
CALCIUM SERPL-MCNC: 9.3 MG/DL (ref 8.8–10.4)
CREAT SERPL-MCNC: 0.97 MG/DL (ref 0.51–0.95)
EGFRCR SERPLBLD CKD-EPI 2021: 64 ML/MIN/1.73M2

## 2025-09-04 PROCEDURE — 250N000011 HC RX IP 250 OP 636: Performed by: INTERNAL MEDICINE

## 2025-09-04 PROCEDURE — 82565 ASSAY OF CREATININE: CPT | Performed by: INTERNAL MEDICINE

## 2025-09-04 PROCEDURE — 36415 COLL VENOUS BLD VENIPUNCTURE: CPT | Performed by: INTERNAL MEDICINE

## 2025-09-04 PROCEDURE — 82310 ASSAY OF CALCIUM: CPT | Performed by: INTERNAL MEDICINE

## 2025-09-04 PROCEDURE — 250N000013 HC RX MED GY IP 250 OP 250 PS 637: Performed by: INTERNAL MEDICINE

## 2025-09-04 RX ORDER — ACETAMINOPHEN 325 MG/1
650 TABLET ORAL
Status: DISCONTINUED | OUTPATIENT
Start: 2025-09-04 | End: 2025-09-04 | Stop reason: HOSPADM

## 2025-09-04 RX ORDER — ZOLEDRONIC ACID 0.05 MG/ML
5 INJECTION, SOLUTION INTRAVENOUS ONCE
Status: COMPLETED | OUTPATIENT
Start: 2025-09-04 | End: 2025-09-04

## 2025-09-04 RX ADMIN — ZOLEDRONIC ACID 5 MG: 0.05 INJECTION, SOLUTION INTRAVENOUS at 10:13

## 2025-09-04 RX ADMIN — ACETAMINOPHEN 650 MG: 325 TABLET ORAL at 09:29

## 2025-09-04 ASSESSMENT — PAIN SCALES - GENERAL: PAINLEVEL_OUTOF10: SEVERE PAIN (7)
